# Patient Record
Sex: FEMALE | Race: WHITE | NOT HISPANIC OR LATINO | URBAN - METROPOLITAN AREA
[De-identification: names, ages, dates, MRNs, and addresses within clinical notes are randomized per-mention and may not be internally consistent; named-entity substitution may affect disease eponyms.]

---

## 2021-01-12 ENCOUNTER — OCCMED (OUTPATIENT)
Dept: URGENT CARE | Facility: CLINIC | Age: 31
End: 2021-01-12

## 2021-01-12 ENCOUNTER — APPOINTMENT (OUTPATIENT)
Dept: LAB | Facility: CLINIC | Age: 31
End: 2021-01-12

## 2021-01-12 DIAGNOSIS — Z02.1 PRE-EMPLOYMENT HEALTH SCREENING EXAMINATION: Primary | ICD-10-CM

## 2021-01-12 DIAGNOSIS — Z02.1 PRE-EMPLOYMENT HEALTH SCREENING EXAMINATION: ICD-10-CM

## 2021-01-12 PROCEDURE — 86787 VARICELLA-ZOSTER ANTIBODY: CPT

## 2021-01-12 PROCEDURE — 86762 RUBELLA ANTIBODY: CPT

## 2021-01-12 PROCEDURE — 86735 MUMPS ANTIBODY: CPT

## 2021-01-12 PROCEDURE — 86480 TB TEST CELL IMMUN MEASURE: CPT

## 2021-01-12 PROCEDURE — 86765 RUBEOLA ANTIBODY: CPT

## 2021-01-12 PROCEDURE — 36415 COLL VENOUS BLD VENIPUNCTURE: CPT

## 2021-01-13 LAB — RUBV IGG SERPL IA-ACNC: 22.2 IU/ML

## 2021-01-14 LAB
GAMMA INTERFERON BACKGROUND BLD IA-ACNC: 0.05 IU/ML
M TB IFN-G BLD-IMP: NEGATIVE
M TB IFN-G CD4+ BCKGRND COR BLD-ACNC: -0.01 IU/ML
M TB IFN-G CD4+ BCKGRND COR BLD-ACNC: -0.01 IU/ML
MEV IGG SER QL: ABNORMAL
MITOGEN IGNF BCKGRD COR BLD-ACNC: >10 IU/ML
MUV IGG SER QL: NORMAL
VZV IGG SER IA-ACNC: NORMAL

## 2021-03-25 ENCOUNTER — IMMUNIZATIONS (OUTPATIENT)
Dept: FAMILY MEDICINE CLINIC | Facility: HOSPITAL | Age: 31
End: 2021-03-25

## 2021-03-25 DIAGNOSIS — Z23 ENCOUNTER FOR IMMUNIZATION: Primary | ICD-10-CM

## 2021-03-25 PROCEDURE — 0001A SARS-COV-2 / COVID-19 MRNA VACCINE (PFIZER-BIONTECH) 30 MCG: CPT

## 2021-03-25 PROCEDURE — 91300 SARS-COV-2 / COVID-19 MRNA VACCINE (PFIZER-BIONTECH) 30 MCG: CPT

## 2021-04-16 ENCOUNTER — OFFICE VISIT (OUTPATIENT)
Dept: URGENT CARE | Facility: CLINIC | Age: 31
End: 2021-04-16
Payer: COMMERCIAL

## 2021-04-16 VITALS — HEART RATE: 60 BPM | RESPIRATION RATE: 16 BRPM | TEMPERATURE: 97.6 F | OXYGEN SATURATION: 98 %

## 2021-04-16 DIAGNOSIS — R39.15 URINARY URGENCY: Primary | ICD-10-CM

## 2021-04-16 LAB
SL AMB  POCT GLUCOSE, UA: NEGATIVE
SL AMB LEUKOCYTE ESTERASE,UA: ABNORMAL
SL AMB POCT BILIRUBIN,UA: NEGATIVE
SL AMB POCT BLOOD,UA: ABNORMAL
SL AMB POCT CLARITY,UA: ABNORMAL
SL AMB POCT COLOR,UA: YELLOW
SL AMB POCT KETONES,UA: NEGATIVE
SL AMB POCT NITRITE,UA: NEGATIVE
SL AMB POCT PH,UA: 6
SL AMB POCT SPECIFIC GRAVITY,UA: 1.01
SL AMB POCT URINE PROTEIN: NEGATIVE
SL AMB POCT UROBILINOGEN: 0.2

## 2021-04-16 PROCEDURE — G0382 LEV 3 HOSP TYPE B ED VISIT: HCPCS | Performed by: PHYSICIAN ASSISTANT

## 2021-04-16 PROCEDURE — 87086 URINE CULTURE/COLONY COUNT: CPT | Performed by: PHYSICIAN ASSISTANT

## 2021-04-16 RX ORDER — BUSPIRONE HYDROCHLORIDE 10 MG/1
10 TABLET ORAL 3 TIMES DAILY
COMMUNITY
Start: 2021-03-04

## 2021-04-16 RX ORDER — CIPROFLOXACIN 500 MG/1
500 TABLET, FILM COATED ORAL EVERY 12 HOURS SCHEDULED
Qty: 14 TABLET | Refills: 0 | Status: SHIPPED | OUTPATIENT
Start: 2021-04-16 | End: 2021-04-23

## 2021-04-16 RX ORDER — ESCITALOPRAM OXALATE 5 MG/1
5 TABLET ORAL DAILY
COMMUNITY
Start: 2021-03-04

## 2021-04-16 RX ORDER — TRAZODONE HYDROCHLORIDE 50 MG/1
50 TABLET ORAL DAILY
COMMUNITY
Start: 2021-03-04

## 2021-04-16 NOTE — PATIENT INSTRUCTIONS
Urinary Tract Infection in Women   WHAT YOU NEED TO KNOW:   A urinary tract infection (UTI) is caused by bacteria that get inside your urinary tract  Most bacteria that enter your urinary tract come out when you urinate  If the bacteria stay in your urinary tract, you may get an infection  Your urinary tract includes your kidneys, ureters, bladder, and urethra  Urine is made in your kidneys, and it flows from the ureters to the bladder  Urine leaves the bladder through the urethra  A UTI is more common in your lower urinary tract, which includes your bladder and urethra  DISCHARGE INSTRUCTIONS:   Return to the emergency department if:   · You are urinating very little or not at all  · You have a high fever with shaking chills  · You have side or back pain that gets worse  Call your doctor if:   · You have a fever  · You do not feel better after 2 days of taking antibiotics  · You are vomiting  · You have questions or concerns about your condition or care  Medicines:   · Antibiotics  help fight a bacterial infection  If you have UTIs often (called recurrent UTIs), you may be given antibiotics to take regularly  You will be given directions for when and how to use antibiotics  The goal is to prevent UTIs but not cause antibiotic resistance by using antibiotics too often  · Medicines  may be given to decrease pain and burning when you urinate  They will also help decrease the feeling that you need to urinate often  These medicines will make your urine orange or red  · Take your medicine as directed  Contact your healthcare provider if you think your medicine is not helping or if you have side effects  Tell him or her if you are allergic to any medicine  Keep a list of the medicines, vitamins, and herbs you take  Include the amounts, and when and why you take them  Bring the list or the pill bottles to follow-up visits   Carry your medicine list with you in case of an emergency  Follow up with your healthcare provider as directed:  Write down your questions so you remember to ask them during your visits  Prevent another UTI:   · Empty your bladder often  Urinate and empty your bladder as soon as you feel the need  Do not hold your urine for long periods of time  · Wipe from front to back after you urinate or have a bowel movement  This will help prevent germs from getting into your urinary tract through your urethra  · Drink liquids as directed  Ask how much liquid to drink each day and which liquids are best for you  You may need to drink more liquids than usual to help flush out the bacteria  Do not drink alcohol, caffeine, or citrus juices  These can irritate your bladder and increase your symptoms  Your healthcare provider may recommend cranberry juice to help prevent a UTI  · Urinate after you have sex  This can help flush out bacteria passed during sex  · Do not douche or use feminine deodorants  These can change the chemical balance in your vagina  · Change sanitary pads or tampons often  This will help prevent germs from getting into your urinary tract  · Talk to your healthcare provider about your birth control method  You may need to change your method if it is increasing your risk for UTIs  · Wear cotton underwear and clothes that are loose  Tight pants and nylon underwear can trap moisture and cause bacteria to grow  · Vaginal estrogen may be recommended  This medicine helps prevent UTIs in women who have gone through menopause or are in kendra-menopause  · Do pelvic muscle exercises often  Pelvic muscle exercises may help you start and stop urinating  Strong pelvic muscles may help you empty your bladder easier  Squeeze these muscles tightly for 5 seconds like you are trying to hold back urine  Then relax for 5 seconds  Gradually work up to squeezing for 10 seconds  Do 3 sets of 15 repetitions a day, or as directed      © Copyright [address] Information is for [employer] use only and may not be sold, redistributed or otherwise used for commercial purposes  All illustrations and images included in CareNotes® are the copyrighted property of [employer] D [employer] M [employer]  or Kee Hansen  The above information is an  only  It is not intended as medical advice for individual conditions or treatments  Talk to your doctor, nurse or pharmacist before following any medical regimen to see if it is safe and effective for you

## 2021-04-18 LAB — BACTERIA UR CULT: NORMAL

## 2021-04-19 ENCOUNTER — IMMUNIZATIONS (OUTPATIENT)
Dept: FAMILY MEDICINE CLINIC | Facility: HOSPITAL | Age: 31
End: 2021-04-19

## 2021-04-19 DIAGNOSIS — Z23 ENCOUNTER FOR IMMUNIZATION: Primary | ICD-10-CM

## 2021-04-19 PROCEDURE — 91300 SARS-COV-2 / COVID-19 MRNA VACCINE (PFIZER-BIONTECH) 30 MCG: CPT

## 2021-04-19 PROCEDURE — 0002A SARS-COV-2 / COVID-19 MRNA VACCINE (PFIZER-BIONTECH) 30 MCG: CPT

## 2021-04-19 NOTE — PROGRESS NOTES
[facility]        NAME: Hunter Smiley is a 31 y o  female  : 1990    MRN: [id number]  DATE: 2021  TIME: 8:33 AM    Assessment and Plan   Urinary urgency [R39 15]  1  Urinary urgency  POCT urine dip auto non-scope    Urine culture    ciprofloxacin (CIPRO) 500 mg tablet         Patient Instructions     Discussed symptoms and UA results with pt  I suspect an acute uncomplicated UTI  Will start pt on an oral abx and send out sample for culture to further evaluate  I rec increased hydration, rest, and observation  Should be reevaluated if condition persists/worsens  Follow up with PCP in 3-5 days  Proceed to  ER if symptoms worsen  Chief Complaint     Chief Complaint   Patient presents with    Possible UTI     began two days ago  pt reports urgency and a dull ache in her lower abdomen  denies fever/flank pain/dysuria         History of Present Illness       Pt presents with two day history of urinary frequency, urgency, lower abdominal discomfort  Denies dysuria, hematuria, flank pain, N/V, vaginal discharge, fever, chills  She has been hydrating  Denies hx of frequent/recurrent UTI  Review of Systems   Review of Systems   Constitutional: Negative  Respiratory: Negative  Cardiovascular: Negative  Gastrointestinal: Positive for abdominal pain (Lower)  Negative for nausea and vomiting  Genitourinary: Positive for frequency and urgency  Negative for dysuria, flank pain, hematuria and vaginal discharge           Current Medications       Current Outpatient Medications:     busPIRone (BUSPAR) 10 mg tablet, Take 10 mg by mouth 3 (three) times a day, Disp: , Rfl:     ciprofloxacin (CIPRO) 500 mg tablet, Take 1 tablet (500 mg total) by mouth every 12 (twelve) hours for 7 days, Disp: 14 tablet, Rfl: 0    escitalopram (LEXAPRO) 5 mg tablet, Take 5 mg by mouth daily, Disp: , Rfl:     traZODone (DESYREL) 50 mg tablet, Take 50 mg by mouth daily, Disp: , Rfl:     Current Allergies     Allergies as of 04/16/2021    (No Known Allergies)            The following portions of the patient's history were reviewed and updated as appropriate: allergies, current medications, past family history, past medical history, past social history, past surgical history and problem list      History reviewed  No pertinent past medical history  History reviewed  No pertinent surgical history  History reviewed  No pertinent family history  Medications have been verified  Objective   Pulse 60   Temp 97 6 °F (36 4 °C)   Resp 16   SpO2 98%   No LMP recorded  Physical Exam     Physical Exam  Vitals signs reviewed  Constitutional:       General: She is not in acute distress  Appearance: She is well-developed  HENT:      Mouth/Throat:      Mouth: Mucous membranes are moist       Pharynx: Oropharynx is clear  Cardiovascular:      Rate and Rhythm: Normal rate and regular rhythm  Heart sounds: Normal heart sounds  No murmur  Pulmonary:      Effort: Pulmonary effort is normal  No respiratory distress  Breath sounds: Normal breath sounds  Abdominal:      Tenderness: There is no right CVA tenderness or left CVA tenderness  Neurological:      Mental Status: She is alert and oriented to person, place, and time

## 2021-07-26 ENCOUNTER — HOSPITAL ENCOUNTER (EMERGENCY)
Facility: HOSPITAL | Age: 31
Discharge: HOME | End: 2021-07-26
Attending: EMERGENCY MEDICINE | Admitting: EMERGENCY MEDICINE
Payer: COMMERCIAL

## 2021-07-26 VITALS
RESPIRATION RATE: 18 BRPM | OXYGEN SATURATION: 98 % | HEART RATE: 69 BPM | SYSTOLIC BLOOD PRESSURE: 109 MMHG | TEMPERATURE: 97 F | DIASTOLIC BLOOD PRESSURE: 60 MMHG | WEIGHT: 125 LBS

## 2021-07-26 DIAGNOSIS — H53.412 VISUAL FIELD SCOTOMA OF LEFT EYE: Primary | ICD-10-CM

## 2021-07-26 PROCEDURE — 99283 EMERGENCY DEPT VISIT LOW MDM: CPT

## 2021-07-26 PROCEDURE — 99282 EMERGENCY DEPT VISIT SF MDM: CPT | Performed by: PHYSICIAN ASSISTANT

## 2021-07-26 NOTE — ED PROVIDER NOTES
History  Chief Complaint   Patient presents with    Blurred Vision     Patient states that about a month ago, she had blurry vision that lasted 15 minutes  The same thing happened today where she developed blurry vision while working and lasted about 15 minutes     32 yo female presents to the Emergency Department for evaluation of brief visual abnormality occurring today for 15 minutes  Describes a "wavy structure" in the L eye periphery  Had similar 1-2 months ago  No associated migraine headache  Currently asymptomatic  Does not use visual correction  Prior to Admission Medications   Prescriptions Last Dose Informant Patient Reported? Taking?   busPIRone (BUSPAR) 10 mg tablet   Yes No   Sig: Take 10 mg by mouth 3 (three) times a day   escitalopram (LEXAPRO) 5 mg tablet   Yes No   Sig: Take 5 mg by mouth daily   traZODone (DESYREL) 50 mg tablet   Yes No   Sig: Take 50 mg by mouth daily      Facility-Administered Medications: None       Past Medical History:   Diagnosis Date    Anxiety     Depression        History reviewed  No pertinent surgical history  History reviewed  No pertinent family history  I have reviewed and agree with the history as documented  E-Cigarette/Vaping     E-Cigarette/Vaping Substances     Social History     Tobacco Use    Smoking status: Former Smoker    Smokeless tobacco: Never Used   Substance Use Topics    Alcohol use: Not Currently    Drug use: Not Currently       Review of Systems   Constitutional: Negative for chills, diaphoresis and fever  Eyes: Positive for visual disturbance  Respiratory: Negative for cough and shortness of breath  Cardiovascular: Negative for chest pain and palpitations  Gastrointestinal: Negative for abdominal pain, diarrhea, nausea and vomiting  Genitourinary: Negative for dysuria, flank pain and frequency  Musculoskeletal: Negative for arthralgias and myalgias  Skin: Negative for color change, rash and wound  Allergic/Immunologic: Negative for immunocompromised state  Neurological: Negative for dizziness and light-headedness  Hematological: Does not bruise/bleed easily  Psychiatric/Behavioral: Negative for confusion  The patient is not nervous/anxious  Physical Exam  Physical Exam  Vitals reviewed  Constitutional:       General: She is not in acute distress  Appearance: She is well-developed  She is not diaphoretic  HENT:      Head: Normocephalic and atraumatic  Eyes:      General: No scleral icterus  Conjunctiva/sclera:      Right eye: Right conjunctiva is not injected  No chemosis  Left eye: Left conjunctiva is not injected  No chemosis  Pupils: Pupils are equal, round, and reactive to light  Funduscopic exam:     Right eye: No hemorrhage, exudate, AV nicking or papilledema  Red reflex present  Left eye: No hemorrhage, exudate, AV nicking or papilledema  Red reflex present  Visual Fields: Right eye visual fields normal and left eye visual fields normal    Cardiovascular:      Rate and Rhythm: Normal rate and regular rhythm  Heart sounds: No murmur heard  No friction rub  No gallop  Pulmonary:      Effort: No respiratory distress  Breath sounds: No wheezing or rales  Skin:     General: Skin is dry  Capillary Refill: Capillary refill takes less than 2 seconds     Psychiatric:         Behavior: Behavior normal          Vital Signs  ED Triage Vitals [07/26/21 1625]   Temperature Pulse Respirations Blood Pressure SpO2   (!) 97 °F (36 1 °C) 69 18 109/60 98 %      Temp Source Heart Rate Source Patient Position - Orthostatic VS BP Location FiO2 (%)   Tympanic -- Sitting Left arm --      Pain Score       --           Vitals:    07/26/21 1625   BP: 109/60   Pulse: 69   Patient Position - Orthostatic VS: Sitting         Visual Acuity      ED Medications  Medications - No data to display    Diagnostic Studies  Results Reviewed     None                 No orders to display              Procedures  Procedures         ED Course                             SBIRT 22yo+      Most Recent Value   SBIRT (24 yo +)   In order to provide better care to our patients, we are screening all of our patients for alcohol and drug use  Would it be okay to ask you these screening questions? No Filed at: 07/26/2021 1639                    [facility]  Number of Diagnoses or Management Options  Visual field scotoma of left eye: new and does not require workup  Diagnosis management comments: Likely ocular migraine  Recommend outpatient ophtho follow up       Amount and/or Complexity of Data Reviewed  Review and summarize past medical records: yes        Disposition  Final diagnoses:   Visual field scotoma of left eye     Time reflects when diagnosis was documented in both MDM as applicable and the Disposition within this note     Time User Action Codes Description Comment    7/26/2021  4:40 PM Marynadya Kimani Add [T92 207] Visual field scotoma of left eye       ED Disposition     ED Disposition Condition Date/Time Comment    Discharge Stable Mon Jul 26, 2021  4:40 PM Debbie Yeung discharge to home/self care  Follow-up Information     Follow up With Specialties Details Why Contact Info    Lucero Sims MD Ophthalmology   [address] [place], [place] [phone]            Discharge Medication List as of 7/26/2021  4:40 PM      CONTINUE these medications which have NOT CHANGED    Details   busPIRone (BUSPAR) 10 mg tablet Take 10 mg by mouth 3 (three) times a day, Starting Thu 3/4/2021, Historical Med      escitalopram (LEXAPRO) 5 mg tablet Take 5 mg by mouth daily, Starting Thu 3/4/2021, Historical Med      traZODone (DESYREL) 50 mg tablet Take 50 mg by mouth daily, Starting Thu 3/4/2021, Historical Med           No discharge procedures on file      PDMP Review     None          ED Provider  Electronically Signed by           Dago Silva PA-C  07/26/21 0896

## 2021-12-17 ENCOUNTER — APPOINTMENT (OUTPATIENT)
Dept: URGENT CARE | Facility: CLINIC | Age: 31
End: 2021-12-17

## 2021-12-17 DIAGNOSIS — Z02.1 PRE-EMPLOYMENT HEALTH SCREENING EXAMINATION: Primary | ICD-10-CM

## 2021-12-17 PROCEDURE — 86480 TB TEST CELL IMMUN MEASURE: CPT

## 2021-12-20 LAB
GAMMA INTERFERON BACKGROUND BLD IA-ACNC: 0.04 IU/ML
M TB IFN-G BLD-IMP: NEGATIVE
M TB IFN-G CD4+ BCKGRND COR BLD-ACNC: 0 IU/ML
M TB IFN-G CD4+ BCKGRND COR BLD-ACNC: 0.01 IU/ML
MITOGEN IGNF BCKGRD COR BLD-ACNC: >10 IU/ML

## 2022-04-26 ENCOUNTER — APPOINTMENT (OUTPATIENT)
Dept: LAB | Facility: CLINIC | Age: 32
End: 2022-04-26

## 2022-04-26 DIAGNOSIS — Z00.8 HEALTH EXAMINATION IN POPULATION SURVEYS: ICD-10-CM

## 2022-04-26 DIAGNOSIS — Z13.0 SCREENING FOR IRON DEFICIENCY ANEMIA: ICD-10-CM

## 2022-04-26 DIAGNOSIS — Z13.29 SCREENING FOR THYROID DISORDER: ICD-10-CM

## 2022-04-26 DIAGNOSIS — Z00.00 ANNUAL PHYSICAL EXAM: ICD-10-CM

## 2022-04-26 DIAGNOSIS — Z13.6 SCREENING FOR CARDIOVASCULAR CONDITION: ICD-10-CM

## 2022-04-26 LAB
CHOLEST SERPL-MCNC: 223 MG/DL
EST. AVERAGE GLUCOSE BLD GHB EST-MCNC: 103 MG/DL
HBA1C MFR BLD: 5.2 %
HDLC SERPL-MCNC: 59 MG/DL
LDLC SERPL CALC-MCNC: 148 MG/DL (ref 0–100)
NONHDLC SERPL-MCNC: 164 MG/DL
TRIGL SERPL-MCNC: 79 MG/DL

## 2022-04-26 PROCEDURE — 83550 IRON BINDING TEST: CPT

## 2022-04-26 PROCEDURE — 80061 LIPID PANEL: CPT

## 2022-04-26 PROCEDURE — 84443 ASSAY THYROID STIM HORMONE: CPT

## 2022-04-26 PROCEDURE — 80053 COMPREHEN METABOLIC PANEL: CPT

## 2022-04-26 PROCEDURE — 83036 HEMOGLOBIN GLYCOSYLATED A1C: CPT

## 2022-04-26 PROCEDURE — 83540 ASSAY OF IRON: CPT

## 2022-04-26 PROCEDURE — 36415 COLL VENOUS BLD VENIPUNCTURE: CPT

## 2022-04-26 PROCEDURE — 82728 ASSAY OF FERRITIN: CPT

## 2022-04-27 ENCOUNTER — OFFICE VISIT (OUTPATIENT)
Dept: FAMILY MEDICINE CLINIC | Facility: CLINIC | Age: 32
End: 2022-04-27
Payer: COMMERCIAL

## 2022-04-27 VITALS
RESPIRATION RATE: 16 BRPM | HEIGHT: 63 IN | OXYGEN SATURATION: 100 % | SYSTOLIC BLOOD PRESSURE: 116 MMHG | WEIGHT: 125.8 LBS | TEMPERATURE: 98 F | HEART RATE: 64 BPM | DIASTOLIC BLOOD PRESSURE: 72 MMHG | BODY MASS INDEX: 22.29 KG/M2

## 2022-04-27 DIAGNOSIS — Z13.29 SCREENING FOR THYROID DISORDER: ICD-10-CM

## 2022-04-27 DIAGNOSIS — Z11.59 ENCOUNTER FOR HEPATITIS C SCREENING TEST FOR LOW RISK PATIENT: ICD-10-CM

## 2022-04-27 DIAGNOSIS — Z13.6 SCREENING FOR CARDIOVASCULAR CONDITION: ICD-10-CM

## 2022-04-27 DIAGNOSIS — Z11.4 SCREENING FOR HIV WITHOUT PRESENCE OF RISK FACTORS: ICD-10-CM

## 2022-04-27 DIAGNOSIS — Z00.00 ANNUAL PHYSICAL EXAM: Primary | ICD-10-CM

## 2022-04-27 DIAGNOSIS — Z13.0 SCREENING FOR IRON DEFICIENCY ANEMIA: ICD-10-CM

## 2022-04-27 DIAGNOSIS — Z13.1 SCREENING FOR DIABETES MELLITUS: ICD-10-CM

## 2022-04-27 PROBLEM — F33.41 RECURRENT MAJOR DEPRESSIVE DISORDER, IN PARTIAL REMISSION (HCC): Status: ACTIVE | Noted: 2022-04-27

## 2022-04-27 PROBLEM — D50.8 IRON DEFICIENCY ANEMIA SECONDARY TO INADEQUATE DIETARY IRON INTAKE: Status: ACTIVE | Noted: 2022-04-27

## 2022-04-27 PROBLEM — G47.09 OTHER INSOMNIA: Status: ACTIVE | Noted: 2022-04-27

## 2022-04-27 PROBLEM — F41.9 ANXIETY: Status: ACTIVE | Noted: 2022-04-27

## 2022-04-27 LAB
ALBUMIN SERPL BCP-MCNC: 3.8 G/DL (ref 3.5–5)
ALP SERPL-CCNC: 48 U/L (ref 46–116)
ALT SERPL W P-5'-P-CCNC: 26 U/L (ref 12–78)
ANION GAP SERPL CALCULATED.3IONS-SCNC: 4 MMOL/L (ref 4–13)
AST SERPL W P-5'-P-CCNC: 22 U/L (ref 5–45)
BILIRUB SERPL-MCNC: 0.48 MG/DL (ref 0.2–1)
BUN SERPL-MCNC: 12 MG/DL (ref 5–25)
CALCIUM SERPL-MCNC: 9 MG/DL (ref 8.3–10.1)
CHLORIDE SERPL-SCNC: 105 MMOL/L (ref 100–108)
CO2 SERPL-SCNC: 27 MMOL/L (ref 21–32)
CREAT SERPL-MCNC: 0.68 MG/DL (ref 0.6–1.3)
FERRITIN SERPL-MCNC: 74 NG/ML (ref 8–388)
GFR SERPL CREATININE-BSD FRML MDRD: 116 ML/MIN/1.73SQ M
GLUCOSE SERPL-MCNC: 93 MG/DL (ref 65–140)
IRON SERPL-MCNC: 115 UG/DL (ref 50–170)
POTASSIUM SERPL-SCNC: 4.3 MMOL/L (ref 3.5–5.3)
PROT SERPL-MCNC: 6.9 G/DL (ref 6.4–8.2)
SODIUM SERPL-SCNC: 136 MMOL/L (ref 136–145)
TIBC SERPL-MCNC: 328 UG/DL (ref 250–450)
TSH SERPL DL<=0.05 MIU/L-ACNC: 1.46 UIU/ML (ref 0.45–4.5)

## 2022-04-27 PROCEDURE — 99385 PREV VISIT NEW AGE 18-39: CPT | Performed by: NURSE PRACTITIONER

## 2022-04-27 RX ORDER — CLINDAMYCIN PHOSPHATE 10 MG/ML
SOLUTION TOPICAL
COMMUNITY
Start: 2022-04-25

## 2022-04-27 NOTE — PROGRESS NOTES
ADULT ANNUAL PHYSICAL  [address]    NAME: Debbie Yeung  AGE: 27 y o  SEX: female  : 1990     DATE: 2022     Assessment and Plan:     Problem List Items Addressed This Visit     None      Visit Diagnoses     Annual physical exam    -  Primary    Relevant Orders    CBC and differential    TSH, 3rd generation with Free T4 reflex    Comprehensive metabolic panel    Ambulatory Referral to Obstetrics / Gynecology    Screening for cardiovascular condition        Relevant Orders    CBC and differential    Comprehensive metabolic panel    Screening for iron deficiency anemia        Relevant Orders    Iron    Ferritin    TIBC    Screening for thyroid disorder        Relevant Orders    TSH, 3rd generation with Free T4 reflex    Screening for diabetes mellitus        Relevant Orders    UA (URINE) with reflex to Scope    Encounter for hepatitis C screening test for low risk patient        Relevant Orders    Hepatitis C antibody    Screening for HIV without presence of risk factors        Relevant Orders    HIV 1/2 Antigen/Antibody (4th Generation) w Reflex [facility]          Immunizations and preventive care screenings were discussed with patient today  Appropriate education was printed on patient's after visit summary  Counseling:  Alcohol/drug use: discussed moderation in alcohol intake, the recommendations for healthy alcohol use, and avoidance of illicit drug use  Dental Health: discussed importance of regular tooth brushing, flossing, and dental visits  Injury prevention: discussed safety/seat belts, safety helmets, smoke detectors, carbon dioxide detectors, and smoking near bedding or upholstery  Sexual health: discussed sexually transmitted diseases, partner selection, use of condoms, avoidance of unintended pregnancy, and contraceptive alternatives  · Exercise: the importance of regular exercise/physical activity was discussed   Recommend exercise 3-5 times per week for at least 30 minutes  Depression Screening and Follow-up Plan: Patient was screened for depression during today's encounter  They screened negative with a PHQ-2 score of 0  Return in 1 year (on 4/27/2023) for Annual physical      Chief Complaint:     Chief Complaint   Patient presents with    Establish care     needs wellness done for caring starts with you      History of Present Illness:     Adult Annual Physical   Patient here for a comprehensive physical exam  The patient reports no problems  Diet and Physical Activity  · Diet/Nutrition: well balanced diet, frequent junk food and consuming 3-5 servings of fruits/vegetables daily  · Exercise: 1-2 times a week on average and 30-60 minutes on average  Depression Screening  PHQ-2/9 Depression Screening    Little interest or pleasure in doing things: 0 - not at all  Feeling down, depressed, or hopeless: 0 - not at all  PHQ-2 Score: 0  PHQ-2 Interpretation: Negative depression screen       General Health  · Sleep: sleeps well and gets 7-8 hours of sleep on average  · Hearing: normal - bilateral   · Vision: vision problems: wears glasses at night with driving, most recent eye exam <1 year ago and wears glasses  · Dental: regular dental visits and brushes teeth twice daily  Flosses daily  /GYN Health  · Last menstrual period: 4/10/22  · Contraceptive method: IUD placement  · History of STDs?: no      Review of Systems:     Review of Systems   Constitutional: Negative  Negative for chills, fatigue and fever  HENT: Negative  Negative for congestion, ear discharge, ear pain, rhinorrhea, sinus pressure, sinus pain and sore throat  Eyes: Negative  Negative for photophobia, pain and discharge  Respiratory: Negative  Negative for cough, chest tightness, shortness of breath and wheezing  Cardiovascular: Negative  Negative for chest pain, palpitations and leg swelling     Gastrointestinal: Negative  Negative for abdominal pain, constipation, diarrhea, nausea and vomiting  Endocrine: Negative  Negative for polydipsia and polyuria  Genitourinary: Negative  Negative for difficulty urinating, dysuria and hematuria  Musculoskeletal: Negative  Negative for arthralgias, back pain, myalgias, neck pain and neck stiffness  Skin: Negative  Negative for rash  Allergic/Immunologic: Positive for environmental allergies  Neurological: Negative  Negative for dizziness, seizures, syncope, light-headedness, numbness and headaches  Hematological: Negative  Does not bruise/bleed easily  Psychiatric/Behavioral: Negative  Negative for agitation  The patient is not nervous/anxious  Past Medical History:     Past Medical History:   Diagnosis Date    Anxiety     Depression       Past Surgical History:     History reviewed  No pertinent surgical history  Social History:     Social History     Socioeconomic History    Marital status: Single     Spouse name: None    Number of children: None    Years of education: None    Highest education level: None   Occupational History    None   Tobacco Use    Smoking status: Former Smoker     Types: Cigarettes     Quit date: 4/27/2019     Years since quitting: 3 0    Smokeless tobacco: Never Used   Vaping Use    Vaping Use: Never used   Substance and Sexual Activity    Alcohol use: Not Currently    Drug use: Not Currently    Sexual activity: None   Other Topics Concern    None   Social History Narrative    None     Social Determinants of Health     Financial Resource Strain: Not on file   Food Insecurity: Not on file   Transportation Needs: Not on file   Physical Activity: Not on file   Stress: Not on file   Social Connections: Not on file   Intimate Partner Violence: Not on file   Housing Stability: Not on file      Family History:     History reviewed  No pertinent family history     Current Medications:     Current Outpatient Medications Medication Sig Dispense Refill    busPIRone (BUSPAR) 10 mg tablet Take 10 mg by mouth 3 (three) times a day      escitalopram (LEXAPRO) 5 mg tablet Take 5 mg by mouth daily      traZODone (DESYREL) 50 mg tablet Take 50 mg by mouth daily      clindamycin (CLEOCIN T) 1 %  (Patient not taking: Reported on 4/27/2022 )       No current facility-administered medications for this visit  Allergies:     No Known Allergies   Physical Exam:     /72 (BP Location: Left arm, Patient Position: Sitting, Cuff Size: Standard)   Pulse 64   Temp 98 °F (36 7 °C) (Tympanic)   Resp 16   Ht 5' 3" (1 6 m)   Wt 57 1 kg (125 lb 12 8 oz)   LMP 04/10/2022 (Exact Date)   SpO2 100%   Breastfeeding No   BMI 22 28 kg/m²     Physical Exam  Vitals and nursing note reviewed  Constitutional:       General: She is not in acute distress  Appearance: Normal appearance  She is well-developed and normal weight  She is not ill-appearing  HENT:      Head: Normocephalic and atraumatic  Right Ear: Tympanic membrane, ear canal and external ear normal  There is no impacted cerumen  Left Ear: Tympanic membrane, ear canal and external ear normal  There is no impacted cerumen  Nose: Nose normal  No congestion or rhinorrhea  Mouth/Throat:      Mouth: Mucous membranes are moist       Pharynx: Oropharynx is clear  No oropharyngeal exudate  Eyes:      Extraocular Movements: Extraocular movements intact  Conjunctiva/sclera: Conjunctivae normal    Cardiovascular:      Rate and Rhythm: Normal rate and regular rhythm  Pulses: Normal pulses  Heart sounds: Normal heart sounds  No murmur heard  Pulmonary:      Effort: Pulmonary effort is normal  No respiratory distress  Breath sounds: Normal breath sounds  Abdominal:      General: Bowel sounds are normal       Palpations: Abdomen is soft  Tenderness: There is no abdominal tenderness     Musculoskeletal:         General: Normal range of motion  Cervical back: Normal range of motion and neck supple  Right lower leg: No edema  Left lower leg: No edema  Lymphadenopathy:      Cervical: No cervical adenopathy  Skin:     General: Skin is warm and dry  Capillary Refill: Capillary refill takes less than 2 seconds  Neurological:      General: No focal deficit present  Mental Status: She is alert and oriented to person, place, and time  Psychiatric:         Mood and Affect: Mood normal          Behavior: Behavior normal          Thought Content:  Thought content normal          Judgment: Judgment normal           CINDI Hill   [address] [facility]

## 2022-04-27 NOTE — PATIENT INSTRUCTIONS
Have labs drawn- will call patient with results  Follow up in 1 year for annual physical      Wellness Visit for Adults   AMBULATORY CARE:   A wellness visit  is when you see your healthcare provider to get screened for health problems  Your healthcare provider will also give you advice on how to stay healthy  Write down your questions so you remember to ask them  Ask your healthcare provider how often you should have a wellness visit  What happens at a wellness visit:  Your healthcare provider will ask about your health, and your family history of health problems  This includes high blood pressure, heart disease, and cancer  He or she will ask if you have symptoms that concern you, if you smoke, and about your mood  You may also be asked about your intake of medicines, supplements, food, and alcohol  Any of the following may be done:  · Your weight  will be checked  Your height may also be checked so your body mass index (BMI) can be calculated  Your BMI shows if you are at a healthy weight  · Your blood pressure  and heart rate will be checked  Your temperature may also be checked  · Blood and urine tests  may be done  Blood tests may be done to check your cholesterol levels  Abnormal cholesterol levels increase your risk for heart disease and stroke  You may also need a blood or urine test to check for diabetes if you are at increased risk  Urine tests may be done to look for signs of an infection or kidney disease  · A physical exam  includes checking your heartbeat and lungs with a stethoscope  Your healthcare provider may also check your skin to look for sun damage  · Screening tests  may be recommended  A screening test is done to check for diseases that may not cause symptoms  The screening tests you may need depend on your age, gender, family history, and lifestyle habits  For example, colorectal screening may be recommended if you are 48years old or older      Screening tests you need if you are a woman:   · A Pap smear  is used to screen for cervical cancer  Pap smears are usually done every 3 to 5 years depending on your age  You may need them more often if you have had abnormal Pap smear test results in the past  Ask your healthcare provider how often you should have a Pap smear  · A mammogram  is an x-ray of your breasts to screen for breast cancer  Experts recommend mammograms every 2 years starting at age 48 years  You may need a mammogram at age 52 years or younger if you have an increased risk for breast cancer  Talk to your healthcare provider about when you should start having mammograms and how often you need them  Vaccines you may need:   · Get an influenza vaccine  every year  The influenza vaccine protects you from the flu  Several types of viruses cause the flu  The viruses change over time, so new vaccines are made each year  · Get a tetanus-diphtheria (Td) booster vaccine  every 10 years  This vaccine protects you against tetanus and diphtheria  Tetanus is a severe infection that may cause painful muscle spasms and lockjaw  Diphtheria is a severe bacterial infection that causes a thick covering in the back of your mouth and throat  · Get a human papillomavirus (HPV) vaccine  if you are female and aged 23 to 32 or male 23 to 24 and never received it  This vaccine protects you from HPV infection  HPV is the most common infection spread by sexual contact  HPV may also cause vaginal, penile, and anal cancers  · Get a pneumococcal vaccine  if you are aged 72 years or older  The pneumococcal vaccine is an injection given to protect you from pneumococcal disease  Pneumococcal disease is an infection caused by pneumococcal bacteria  The infection may cause pneumonia, meningitis, or an ear infection  · Get a shingles vaccine  if you are 60 or older, even if you have had shingles before  The shingles vaccine is an injection to protect you from the varicella-zoster virus   This is the same virus that causes chickenpox  Shingles is a painful rash that develops in people who had chickenpox or have been exposed to the virus  How to eat healthy:  My Plate is a model for planning healthy meals  It shows the types and amounts of foods that should go on your plate  Fruits and vegetables make up about half of your plate, and grains and protein make up the other half  A serving of dairy is included on the side of your plate  The amount of calories and serving sizes you need depends on your age, gender, weight, and height  Examples of healthy foods are listed below:  · Eat a variety of vegetables  such as dark green, red, and orange vegetables  You can also include canned vegetables low in sodium (salt) and frozen vegetables without added butter or sauces  · Eat a variety of fresh fruits , canned fruit in 100% juice, frozen fruit, and dried fruit  · Include whole grains  At least half of the grains you eat should be whole grains  Examples include whole-wheat bread, wheat pasta, brown rice, and whole-grain cereals such as oatmeal     · Eat a variety of protein foods such as seafood (fish and shellfish), lean meat, and poultry without skin (turkey and chicken)  Examples of lean meats include pork leg, shoulder, or tenderloin, and beef round, sirloin, tenderloin, and extra lean ground beef  Other protein foods include eggs and egg substitutes, beans, peas, soy products, nuts, and seeds  · Choose low-fat dairy products such as skim or 1% milk or low-fat yogurt, cheese, and cottage cheese  · Limit unhealthy fats  such as butter, hard margarine, and shortening  Exercise:  Exercise at least 30 minutes per day on most days of the week  Some examples of exercise include walking, biking, dancing, and swimming  You can also fit in more physical activity by taking the stairs instead of the elevator or parking farther away from stores  Include muscle strengthening activities 2 days each week  Regular exercise provides many health benefits  It helps you manage your weight, and decreases your risk for type 2 diabetes, heart disease, stroke, and high blood pressure  Exercise can also help improve your mood  Ask your healthcare provider about the best exercise plan for you  General health and safety guidelines:   · Do not smoke  Nicotine and other chemicals in cigarettes and cigars can cause lung damage  Ask your healthcare provider for information if you currently smoke and need help to quit  E-cigarettes or smokeless tobacco still contain nicotine  Talk to your healthcare provider before you use these products  · Limit alcohol  A drink of alcohol is 12 ounces of beer, 5 ounces of wine, or 1½ ounces of liquor  · Lose weight, if needed  Being overweight increases your risk of certain health conditions  These include heart disease, high blood pressure, type 2 diabetes, and certain types of cancer  · Protect your skin  Do not sunbathe or use tanning beds  Use sunscreen with a SPF 15 or higher  Apply sunscreen at least 15 minutes before you go outside  Reapply sunscreen every 2 hours  Wear protective clothing, hats, and sunglasses when you are outside  · Drive safely  Always wear your seatbelt  Make sure everyone in your car wears a seatbelt  A seatbelt can save your life if you are in an accident  Do not use your cell phone when you are driving  This could distract you and cause an accident  Pull over if you need to make a call or send a text message  · Practice safe sex  Use latex condoms if are sexually active and have more than one partner  Your healthcare provider may recommend screening tests for sexually transmitted infections (STIs)  · Wear helmets, lifejackets, and protective gear  Always wear a helmet when you ride a bike or motorcycle, go skiing, or play sports that could cause a head injury  Wear protective equipment when you play sports   Wear a lifejacket when you are on a boat or doing water sports  © Copyright [employer] 2022 Information is for End User's use only and may not be sold, redistributed or otherwise used for commercial purposes  All illustrations and images included in CareNotes® are the copyrighted property of A D A M , Inc  or Kee Hansen  The above information is an  only  It is not intended as medical advice for individual conditions or treatments  Talk to your doctor, nurse or pharmacist before following any medical regimen to see if it is safe and effective for you

## 2022-04-28 ENCOUNTER — APPOINTMENT (OUTPATIENT)
Dept: LAB | Facility: HOSPITAL | Age: 32
End: 2022-04-28
Payer: COMMERCIAL

## 2022-04-28 DIAGNOSIS — Z11.4 SCREENING FOR HIV WITHOUT PRESENCE OF RISK FACTORS: ICD-10-CM

## 2022-04-28 DIAGNOSIS — Z11.59 ENCOUNTER FOR HEPATITIS C SCREENING TEST FOR LOW RISK PATIENT: ICD-10-CM

## 2022-04-28 LAB
BACTERIA UR QL AUTO: NORMAL /HPF
BILIRUB UR QL STRIP: NEGATIVE
CLARITY UR: CLEAR
COLOR UR: YELLOW
GLUCOSE UR STRIP-MCNC: NEGATIVE MG/DL
HCV AB SER QL: NORMAL
HGB UR QL STRIP.AUTO: ABNORMAL
KETONES UR STRIP-MCNC: NEGATIVE MG/DL
LEUKOCYTE ESTERASE UR QL STRIP: NEGATIVE
NITRITE UR QL STRIP: NEGATIVE
NON-SQ EPI CELLS URNS QL MICRO: NORMAL /HPF
PH UR STRIP.AUTO: 6 [PH]
PROT UR STRIP-MCNC: NEGATIVE MG/DL
RBC #/AREA URNS AUTO: NORMAL /HPF
SP GR UR STRIP.AUTO: 1.01 (ref 1–1.03)
UROBILINOGEN UR QL STRIP.AUTO: 0.2 E.U./DL
WBC #/AREA URNS AUTO: NORMAL /HPF

## 2022-04-28 PROCEDURE — 87389 HIV-1 AG W/HIV-1&-2 AB AG IA: CPT

## 2022-04-28 PROCEDURE — 81001 URINALYSIS AUTO W/SCOPE: CPT | Performed by: NURSE PRACTITIONER

## 2022-04-28 PROCEDURE — 36415 COLL VENOUS BLD VENIPUNCTURE: CPT

## 2022-04-28 PROCEDURE — 86803 HEPATITIS C AB TEST: CPT

## 2022-04-29 LAB — HIV 1+2 AB+HIV1 P24 AG SERPL QL IA: NORMAL

## 2022-08-11 ENCOUNTER — TELEPHONE (OUTPATIENT)
Dept: DERMATOLOGY | Facility: CLINIC | Age: 32
End: 2022-08-11

## 2022-10-04 ENCOUNTER — TELEMEDICINE (OUTPATIENT)
Dept: BEHAVIORAL/MENTAL HEALTH CLINIC | Facility: CLINIC | Age: 32
End: 2022-10-04

## 2022-10-04 DIAGNOSIS — F41.1 GENERALIZED ANXIETY DISORDER: Primary | ICD-10-CM

## 2022-10-04 NOTE — BH TREATMENT PLAN
Debbie Yeung  1990       Date of Initial Treatment Plan: 10/4/2022  Date of Current Treatment Plan: 11/02/22    Treatment Plan Number 1    Strengths/Personal Resources for Self Care:  Wants to start working out    Diagnosis:   1  Generalized anxiety disorder         Area of Needs: Co dependency and anxiety       Long Term Goal 1: Decrease relationship anxiety  Target Date: 4/4/2023  Completion Date: N/A         Short Term Objectives for Goal 1: Learn how to not be so preoccupied with relationship  Long Term Goal 2: Increase coping skills for anxiety  Target Date: 4/4/2023  Completion Date: N/A    Short Term Objectives for Goal 2: Coping mechanisms for relational anxiety and work, home, school life balance  Long Term Goal # 3: N/A     Target Date: N/A  Completion Date: N/A    Short Term Objectives for Goal 3: N/A    GOAL 1: Modality: Individual 2-4xx per month   Completion Date 6 months from date    GOAL 2: Modality: Individual 2x per month   Completion Date NA     GOAL 3: Modality: Individual NAx per month   Completion Date NA      Behavioral Health Treatment Plan [facility]: Diagnosis and Treatment Plan explained to Isael Murdock relates understanding diagnosis and is agreeable to Treatment Plan  Client Comments : Please share your thoughts, feelings, need and/or experiences regarding your treatment plan: "I feel that a lot of my anxiety comes from my thoughts " Finding a way to redirect my thoughts would be helpful  Currently tries coping statements and gives ultimatums with boyfriend and have not found them effective  Seeking trauma informed counseling  Would also like to work on people pleasing and self-doubt
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
## 2022-10-11 ENCOUNTER — TELEPHONE (OUTPATIENT)
Dept: BEHAVIORAL/MENTAL HEALTH CLINIC | Facility: CLINIC | Age: 32
End: 2022-10-11

## 2022-10-11 NOTE — TELEPHONE ENCOUNTER
PAULO contacted patient to verify if she needed assistance logging into the session or if she needed to reschedule her appointment  Patient discussed that she was having trouble accessing the link  Therapist in agreement to resend the link so she can join  Patient logged into the session and was at a park and was struggling to find Internet service  Therapist expressed patient needed to be somewhere alone to ensure confidentiality and offered to rescheduled her appointment   Patient in agreement and was rescheduled for 10/18 at 3:00pm

## 2022-10-18 ENCOUNTER — TELEPHONE (OUTPATIENT)
Dept: BEHAVIORAL/MENTAL HEALTH CLINIC | Facility: CLINIC | Age: 32
End: 2022-10-18

## 2022-10-18 ENCOUNTER — TELEMEDICINE (OUTPATIENT)
Dept: BEHAVIORAL/MENTAL HEALTH CLINIC | Facility: CLINIC | Age: 32
End: 2022-10-18

## 2022-10-18 DIAGNOSIS — F41.1 GENERALIZED ANXIETY DISORDER: Primary | ICD-10-CM

## 2022-10-18 NOTE — PSYCH
Start Time: 3:09 PM  End Time: 5:58 PM    Virtual Regular Visit    Verification of patient location:    Patient is located in the following state in which I hold an active license [place]      Assessment/Plan:    Problem List Items Addressed This Visit        Other    Generalized anxiety disorder - Primary          Goals addressed in session: Therapist worked on building therapeutic rapport and exploring treatment goals  Reason for visit is No chief complaint on file  Encounter provider BRANDYN Shannon    Provider located at [address] [place] [address]  [phone]      Recent Visits  Date Type Provider Dept   10/18/22 Telephone Lashay Lipscomb [address] [employer] [employer] [facility]   10/18/22 Telemedicine Lashay Lipscomb [address] Therapist [facility]   Showing recent visits within past 7 days and meeting all other requirements  Future Appointments  No visits were found meeting these conditions  Showing future appointments within next 150 days and meeting all other requirements       The patient was identified by name and date of birth  Farhat Patterson was informed that this is a telemedicine visit and that the visit is being conducted through[address]  She agrees to proceed     My office door was closed  No one else was in the room  She acknowledged consent and understanding of privacy and security of the video platform  The patient has agreed to participate and understands they can discontinue the visit at any time  Patient is aware this is a billable service  Subjective  Farhat Patterson is a 27 y o  female  DATA: Met with Debbie for scheduled individual session  Topics of discussion included relationship with significant other, family stressors, relationships with family, trauma history, parenting concerns, financial issues and substance use recovery   Client joined the session from her car and appeared to be driving  Therapist expressed concern regarding safety and requested that she hang up and sign back in when she arrived home safely; Client in agreement  Client reported wanting to engage in therapy to lean how to eliminate her co-dependency in her relationship with her partner  Client reported that she her partner was away for the past 4 1/2 years in residential and prior to that he was addicted to drug and they have a challenging relationship  She discussed that he returned home in April and acknowledged that he has had a hard time transitioning  She dicussed that he was just prescribed Suboxone and she is unhappy about it as he was sober in residential  Client reported that she was previously prescribed Suboxone for 10 years and identified that his use makes her feel frustrated  She discussed that although her partner is sober, he is still living his past lifestyle staying up all night which has created arguments between them  Client discussed that they share a 15 year old son and they have been together for the past 14 years  Client is currently working part time and 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  Client shows evidence of utilizing emotion regulation skills and effective communication skills skills to manage mental health symptoms  During this session, this clinician used the following therapeutic modalities: engagement strategies, supportive psychotherapy and client-centered therapy  ASSESSMENT: Wang Ortez presents with a depressed mood  Her affect is normal range and intensity, appropriate  Debbie exhibits early engagement with this clinician  Wang Ortez continues to exhibit willingness to work on treatment goals and objectives  Wang Ortez presents with a minimal risk of suicide, minimal risk of self-harm, and minimal risk of harm to others  PLAN: Wang Ortez will return in 1 week for the next scheduled session   Between sessionsDebbie will consider possible treatment goals that she would like to work on in sessions and will report back during the next session re: successes and barriers  At the next session, this clinician will use engagement strategies, supportive psychotherapy and client-centered therapy to address anxiety symptoms, in an effort to assist Debbie with meeting treatment goals  HPI     Past Medical History:   Diagnosis Date   • Anxiety    • Depression        No past surgical history on file  Current Outpatient Medications   Medication Sig Dispense Refill   • busPIRone (BUSPAR) 10 mg tablet Take 10 mg by mouth 3 (three) times a day     • clindamycin (CLEOCIN T) 1 %  (Patient not taking: Reported on 4/27/2022 )     • escitalopram (LEXAPRO) 5 mg tablet Take 5 mg by mouth daily     • traZODone (DESYREL) 50 mg tablet Take 50 mg by mouth daily       No current facility-administered medications for this visit  No Known Allergies    Review of Systems    Video Exam    There were no vitals filed for this visit      Physical Exam     I spent 49 minutes directly with the patient during this visit    This note was not shared with the patient due to this is a psychotherapy note

## 2022-10-20 ENCOUNTER — OFFICE VISIT (OUTPATIENT)
Dept: GYNECOLOGY | Facility: CLINIC | Age: 32
End: 2022-10-20
Payer: COMMERCIAL

## 2022-10-20 VITALS
BODY MASS INDEX: 21.58 KG/M2 | WEIGHT: 121.8 LBS | HEIGHT: 63 IN | DIASTOLIC BLOOD PRESSURE: 72 MMHG | SYSTOLIC BLOOD PRESSURE: 116 MMHG

## 2022-10-20 DIAGNOSIS — F41.1 GENERALIZED ANXIETY DISORDER: ICD-10-CM

## 2022-10-20 DIAGNOSIS — L70.9 ADULT ACNE: ICD-10-CM

## 2022-10-20 DIAGNOSIS — F33.41 RECURRENT MAJOR DEPRESSIVE DISORDER, IN PARTIAL REMISSION (HCC): ICD-10-CM

## 2022-10-20 DIAGNOSIS — Z01.419 WOMEN'S ANNUAL ROUTINE GYNECOLOGICAL EXAMINATION: ICD-10-CM

## 2022-10-20 DIAGNOSIS — N92.6 IRREGULAR MENSTRUATION: ICD-10-CM

## 2022-10-20 DIAGNOSIS — L68.0 IDIOPATHIC HIRSUTISM: Primary | ICD-10-CM

## 2022-10-20 DIAGNOSIS — F41.9 ANXIETY: ICD-10-CM

## 2022-10-20 DIAGNOSIS — N94.6 DYSMENORRHEA: ICD-10-CM

## 2022-10-20 DIAGNOSIS — Z12.4 SCREENING FOR CERVICAL CANCER: ICD-10-CM

## 2022-10-20 DIAGNOSIS — E28.2 PCOS (POLYCYSTIC OVARIAN SYNDROME): ICD-10-CM

## 2022-10-20 DIAGNOSIS — Z97.5 IUD (INTRAUTERINE DEVICE) IN PLACE: ICD-10-CM

## 2022-10-20 PROCEDURE — 99395 PREV VISIT EST AGE 18-39: CPT | Performed by: OBSTETRICS & GYNECOLOGY
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
## 2022-10-23 LAB
DHEA-S SERPL-MCNC: 132 UG/DL (ref 84.8–378)
ERYTHROCYTE [DISTWIDTH] IN BLOOD BY AUTOMATED COUNT: 12.4 % (ref 11.7–15.4)
HCG INTACT+B SERPL-ACNC: <1 MIU/ML
HCT VFR BLD AUTO: 39.7 % (ref 34–46.6)
HGB BLD-MCNC: 13.2 G/DL (ref 11.1–15.9)
MCH RBC QN AUTO: 29.8 PG (ref 26.6–33)
MCHC RBC AUTO-ENTMCNC: 33.2 G/DL (ref 31.5–35.7)
MCV RBC AUTO: 90 FL (ref 79–97)
PLATELET # BLD AUTO: 254 X10E3/UL (ref 150–450)
RBC # BLD AUTO: 4.43 X10E6/UL (ref 3.77–5.28)
TESTOST FREE SERPL-MCNC: 2.5 PG/ML (ref 0–4.2)
TESTOST SERPL-MCNC: 21 NG/DL (ref 8–60)
TSH SERPL DL<=0.005 MIU/L-ACNC: 2.13 UIU/ML (ref 0.45–4.5)
WBC # BLD AUTO: 6.8 X10E3/UL (ref 3.4–10.8)

## 2022-10-25 LAB
CYTOLOGIST CVX/VAG CYTO: NORMAL
DX ICD CODE: NORMAL
HPV I/H RISK 4 DNA CVX QL PROBE+SIG AMP: NEGATIVE
OTHER STN SPEC: NORMAL
PATH REPORT.FINAL DX SPEC: NORMAL
SL AMB NOTE:: NORMAL
SL AMB SPECIMEN ADEQUACY: NORMAL
SL AMB TEST METHODOLOGY: NORMAL

## 2022-10-28 ENCOUNTER — TELEMEDICINE (OUTPATIENT)
Dept: BEHAVIORAL/MENTAL HEALTH CLINIC | Facility: CLINIC | Age: 32
End: 2022-10-28

## 2022-10-28 DIAGNOSIS — F41.1 GENERALIZED ANXIETY DISORDER: Primary | ICD-10-CM

## 2022-10-28 DIAGNOSIS — F33.41 RECURRENT MAJOR DEPRESSIVE DISORDER, IN PARTIAL REMISSION (HCC): ICD-10-CM

## 2022-10-28 NOTE — PSYCH
Virtual Regular Visit    Verification of patient location:    Patient is located in the following state in which I hold an active license [place]      Assessment/Plan:    Problem List Items Addressed This Visit        Other    Recurrent major depressive disorder, in partial remission ([facility] [address] )    Generalized anxiety disorder - Primary          Goals addressed in session: Goal 1          Reason for visit is No chief complaint on file  Encounter provider BRANDYN Kam    Provider located at [address] [place] [address]  [phone]      Recent Visits  Date Type Provider Dept   10/28/22 Telemedicine Ashtyn Bryant, [address] [facility] Therapist [facility]   Showing recent visits within past 7 days and meeting all other requirements  Future Appointments  No visits were found meeting these conditions  Showing future appointments within next 150 days and meeting all other requirements       The patient was identified by name and date of birth  Neli Gomez was informed that this is a telemedicine visit and that the visit is being [facility]  She agrees to proceed     My office door was closed  No one else was in the room  She acknowledged consent and understanding of privacy and security of the video platform  The patient has agreed to participate and understands they can discontinue the visit at any time  Patient is aware this is a billable service  Subjective  Neli Gomez is a 27 y o  female  DATA: Met with Debbie for scheduled individual session  Topics of discussion included relationship with significant other, work-related stress, education-related stress, financial issues, housing issues and substance use recovery  Client reported that she has been feeling better over the past couple days as she left the house to take time to think about what she wants   She reported staying with her brother for a few days and returned to her in-law's home because she needed to  a book she ordered for school  She discussed having an opportunity to have a conversation with her partner about their relationship and they both agreed that they want it to work  Client reported that she intends to set boundaries with her partner and discussed that she tends to have a hard time keeping the boundaries and following through with consequences, therefore nothing ever changes  Client shows evidence of utilizing CBT thought record, weighing pros and cons and effective communication skills skills to manage mental health symptoms  During this session, this clinician used the following therapeutic modalities: engagement strategies, supportive psychotherapy and solution-focused therapy  Clinician provided psychoeducation regarding use of Identifying the boundaries that she would like to set with her partner       ASSESSMENT: Concepcion Alves presents with a less depressed mood  Her affect is normal range and intensity, appropriate  Debbie exhibits early engagement with this clinician  Concepcion Alves continues to exhibit willingness to work on treatment goals and objectives  Concepcion Alves presents with a minimal risk of suicide, minimal risk of self-harm, and minimal risk of harm to others  PLAN: Concepcion Alves will return in 2 weeks for the next scheduled session  Between sessions, Concepcion Alves will continue to set boundaries with partner and practice following through, and will report back during the next session re: successes and barriers  At the next session, this clinician will use engagement strategies, supportive psychotherapy and solution-focused therapy to address co-dependency/anxiety in her relationship, in an effort to assist Debbie with meeting treatment goals  HPI     Past Medical History:   Diagnosis Date   • Anxiety    • Depression        No past surgical history on file      Current Outpatient Medications Medication Sig Dispense Refill   • busPIRone (BUSPAR) 10 mg tablet Take 10 mg by mouth 3 (three) times a day     • clindamycin (CLEOCIN T) 1 %  (Patient not taking: Reported on 4/27/2022 )     • escitalopram (LEXAPRO) 5 mg tablet Take 5 mg by mouth daily     • traZODone (DESYREL) 50 mg tablet Take 50 mg by mouth daily       No current facility-administered medications for this visit  No Known Allergies    Review of Systems    Video Exam    There were no vitals filed for this visit      Physical Exam     Visit Time    Visit Start Time: 3:00PM  Visit Stop Time: 3:46PM  Total Visit Duration: 46 minutes

## 2022-11-01 ENCOUNTER — HOSPITAL ENCOUNTER (OUTPATIENT)
Dept: ULTRASOUND IMAGING | Facility: CLINIC | Age: 32
Discharge: HOME | End: 2022-11-01

## 2022-11-01 DIAGNOSIS — N94.6 DYSMENORRHEA: ICD-10-CM

## 2022-11-08 ENCOUNTER — SOCIAL WORK (OUTPATIENT)
Dept: BEHAVIORAL/MENTAL HEALTH CLINIC | Facility: CLINIC | Age: 32
End: 2022-11-08

## 2022-11-08 DIAGNOSIS — F41.1 GENERALIZED ANXIETY DISORDER: Primary | ICD-10-CM

## 2022-11-08 DIAGNOSIS — F33.41 RECURRENT MAJOR DEPRESSIVE DISORDER, IN PARTIAL REMISSION (HCC): ICD-10-CM
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
## 2022-11-09 ENCOUNTER — HOSPITAL ENCOUNTER (EMERGENCY)
Facility: HOSPITAL | Age: 32
Discharge: HOME | End: 2022-11-09
Attending: EMERGENCY MEDICINE

## 2022-11-09 ENCOUNTER — TELEPHONE (OUTPATIENT)
Dept: PSYCHIATRY | Facility: CLINIC | Age: 32
End: 2022-11-09

## 2022-11-09 VITALS
SYSTOLIC BLOOD PRESSURE: 144 MMHG | RESPIRATION RATE: 17 BRPM | HEART RATE: 66 BPM | TEMPERATURE: 98 F | OXYGEN SATURATION: 99 % | DIASTOLIC BLOOD PRESSURE: 89 MMHG

## 2022-11-09 DIAGNOSIS — S16.1XXA STRAIN OF NECK MUSCLE, INITIAL ENCOUNTER: Primary | ICD-10-CM

## 2022-11-09 DIAGNOSIS — S10.91XA ABRASION OF NECK, INITIAL ENCOUNTER: ICD-10-CM

## 2022-11-09 RX ORDER — METHOCARBAMOL 500 MG/1
500 TABLET, FILM COATED ORAL 4 TIMES DAILY
Qty: 40 TABLET | Refills: 0 | Status: SHIPPED | OUTPATIENT
Start: 2022-11-09 | End: 2022-11-19

## 2022-11-09 NOTE — DISCHARGE INSTRUCTIONS
Warm compresses to the area  Medication as directed  FU with your family doctor, return to the ED for worsening symptoms  FU with your family doctor
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
## 2022-11-15 ENCOUNTER — TELEPHONE (OUTPATIENT)
Dept: PSYCHIATRY | Facility: CLINIC | Age: 32
End: 2022-11-15

## 2022-11-15 NOTE — TELEPHONE ENCOUNTER
Called and left message for client regarding scheduling an appointment for a Psychiatric Evaluation

## 2022-12-07 ENCOUNTER — TELEPHONE (OUTPATIENT)
Dept: BEHAVIORAL/MENTAL HEALTH CLINIC | Facility: CLINIC | Age: 32
End: 2022-12-07

## 2022-12-07 NOTE — TELEPHONE ENCOUNTER
[facility]   Client was scheduled at 3:00pm on 12/13/22 virtual

## 2022-12-13 ENCOUNTER — TELEPHONE (OUTPATIENT)
Dept: BEHAVIORAL/MENTAL HEALTH CLINIC | Facility: CLINIC | Age: 32
End: 2022-12-13

## 2022-12-13 ENCOUNTER — TELEMEDICINE (OUTPATIENT)
Dept: BEHAVIORAL/MENTAL HEALTH CLINIC | Facility: CLINIC | Age: 32
End: 2022-12-13

## 2022-12-13 DIAGNOSIS — F33.41 RECURRENT MAJOR DEPRESSIVE DISORDER, IN PARTIAL REMISSION (HCC): ICD-10-CM

## 2022-12-13 DIAGNOSIS — F41.1 GENERALIZED ANXIETY DISORDER: Primary | ICD-10-CM
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
## 2022-12-13 NOTE — TELEPHONE ENCOUNTER
PAULO contacted patient to verify if she would be joining for her appointment or if she needed to reschedule  Client reported forgetting about the appointment and will log in

## 2022-12-22 ENCOUNTER — OFFICE VISIT (OUTPATIENT)
Dept: GYNECOLOGY | Facility: CLINIC | Age: 32
End: 2022-12-22

## 2022-12-22 VITALS — BODY MASS INDEX: 22.21 KG/M2 | DIASTOLIC BLOOD PRESSURE: 64 MMHG | WEIGHT: 125.4 LBS | SYSTOLIC BLOOD PRESSURE: 104 MMHG

## 2022-12-22 DIAGNOSIS — Z97.5 CONTRACEPTION, DEVICE INTRAUTERINE: ICD-10-CM

## 2022-12-22 DIAGNOSIS — Z30.433 ENCOUNTER FOR REMOVAL AND REINSERTION OF INTRAUTERINE CONTRACEPTIVE DEVICE (IUD): ICD-10-CM

## 2022-12-22 NOTE — PROGRESS NOTES
Iud removal    Date/Time: 12/22/2022 11:57 AM  Performed by: Jeannine Ryder DO  Authorized by: Jeannine Ryder DO   Universal Protocol:  Consent: Written consent obtained    Risks and benefits: risks, benefits and alternatives were discussed  Consent given by: patient  Patient understanding: patient states understanding of the procedure being performed  Patient consent: the patient's understanding of the procedure matches consent given  Procedure consent: procedure consent matches procedure scheduled  Relevant documents: relevant documents present and verified  Patient identity confirmed: verbally with patient      Procedure:     Removed with no complications: yes      Other reason for removal:  Expiration  Comments:      IUD removed without complication intact

## 2022-12-22 NOTE — PROGRESS NOTES
Iud insertions    Date/Time: 12/22/2022 11:58 AM  Performed by: Keyonna Ambrose DO  Authorized by: Keyonna Ambrose DO     Other Assisting Provider: No    Written consent obtained?: Yes    Risks and benefits: Risks, benefits and alternatives were discussed    Consent given by:  Patient  Patient states understanding of procedure being performed: Yes    Patient's understanding of procedure matches consent: Yes    Procedure consent matches procedure scheduled: Yes    Patient identity confirmed:  Verbally with patient  Procedure:     Pelvic exam performed: yes      Negative urine pregnancy test: yes      Cervix cleaned and prepped: yes      Speculum placed in vagina: yes      Allis applied to cervix: yes      Uterus sounded: yes      Uterus sound depth (cm):  7    IUD inserted with no complications: yes      IUD type:  Mirena    Strings trimmed: yes    Post-procedure:     Patient tolerated procedure well: yes      Patient will follow up after next period: yes    Comments:      IUD placed without difficulty, there were no complications  Evi Blase

## 2022-12-28 ENCOUNTER — TELEPHONE (OUTPATIENT)
Dept: GYNECOLOGY | Facility: CLINIC | Age: 32
End: 2022-12-28

## 2022-12-29 ENCOUNTER — TELEPHONE (OUTPATIENT)
Dept: BEHAVIORAL/MENTAL HEALTH CLINIC | Facility: CLINIC | Age: 32
End: 2022-12-29

## 2022-12-29 ENCOUNTER — APPOINTMENT (OUTPATIENT)
Dept: LAB | Facility: CLINIC | Age: 32
End: 2022-12-29

## 2022-12-29 ENCOUNTER — ULTRASOUND (OUTPATIENT)
Dept: GYNECOLOGY | Facility: CLINIC | Age: 32
End: 2022-12-29

## 2022-12-29 ENCOUNTER — OFFICE VISIT (OUTPATIENT)
Dept: GYNECOLOGY | Facility: CLINIC | Age: 32
End: 2022-12-29

## 2022-12-29 VITALS — DIASTOLIC BLOOD PRESSURE: 70 MMHG | BODY MASS INDEX: 22.36 KG/M2 | WEIGHT: 126.2 LBS | SYSTOLIC BLOOD PRESSURE: 120 MMHG

## 2022-12-29 DIAGNOSIS — Z11.3 SCREENING FOR STDS (SEXUALLY TRANSMITTED DISEASES): ICD-10-CM

## 2022-12-29 DIAGNOSIS — B96.89 BV (BACTERIAL VAGINOSIS): ICD-10-CM

## 2022-12-29 DIAGNOSIS — T83.32XA INTRAUTERINE CONTRACEPTIVE DEVICE THREADS LOST, INITIAL ENCOUNTER: Primary | ICD-10-CM

## 2022-12-29 DIAGNOSIS — N76.0 BV (BACTERIAL VAGINOSIS): ICD-10-CM

## 2022-12-29 DIAGNOSIS — T83.32XA INTRAUTERINE CONTRACEPTIVE DEVICE THREADS LOST, INITIAL ENCOUNTER: ICD-10-CM

## 2022-12-29 DIAGNOSIS — Z11.3 SCREEN FOR SEXUALLY TRANSMITTED DISEASES: ICD-10-CM

## 2022-12-29 RX ORDER — CLINDAMYCIN HYDROCHLORIDE 300 MG/1
300 CAPSULE ORAL 2 TIMES DAILY
Qty: 14 CAPSULE | Refills: 0 | Status: SHIPPED | OUTPATIENT
Start: 2022-12-29 | End: 2023-01-05

## 2022-12-29 NOTE — PROGRESS NOTES
Assessment/Plan:    Diagnoses and all orders for this visit:    Intrauterine contraceptive device threads lost, initial encounter  -     US pelvis complete w transvaginal; Future    Screening for STDs (sexually transmitted diseases)  -     Chlamydia/GC amplified DNA by PCR  -     RPR; Future  -     Hepatitis B surface antigen; Future  -     Hepatitis C antibody; Future  -     HIV-1 RNA, quantitative, PCR; Future  -     RPR  -     Hepatitis B surface antigen  -     Hepatitis C antibody  -     HIV-1 RNA, quantitative, PCR    BV (bacterial vaginosis)  -     clindamycin (CLEOCIN) 300 MG capsule; Take 1 capsule (300 mg total) by mouth 2 (two) times a day for 7 days        Subjective: IUD follow-up     Patient ID: Guanakito Herbert is a 27 y o  female  HPI   34-year-old female  1 para 1, was here just last week for IUD exchange  She had a ParaGard IUD in place for 10 years,  and requested a new IUD, Mirena be placed  Her old IUD was successfully removed her new IUD was successfully placed without complication  Her strings were shortened leaving 2 inches exposed from the cervical os  Patient called today states she just does not feel right is having some discomfort with it but not pain  Does have some vaginal discharge  Wet mount was done clue cells identified consistent with bacterial vaginosis  Patient requested additional STD testing ordered  Review of Systems  Unchanged    Objective: No acute distress  /70 (BP Location: Right arm, Patient Position: Sitting, Cuff Size: Standard)   Wt 57 2 kg (126 lb 3 2 oz)   BMI 22 36 kg/m²      Physical Exam  Vitals reviewed  Exam conducted with a chaperone present  Constitutional:       Appearance: Normal appearance  She is normal weight  Eyes:      Extraocular Movements: Extraocular movements intact  Pupils: Pupils are equal, round, and reactive to light     Pulmonary:      Effort: Pulmonary effort is normal    Abdominal:      General: Abdomen is flat  Palpations: Abdomen is soft  Genitourinary:     General: Normal vulva  Comments: Normal external genitalia  Normal size uterus  Normal cervix  No CMT  No pelvic masses  Yellow vaginal discharge, clue cells identified consistent with bacterial vaginosis  IUD strings missing, not visible, not palpable, no abnormalities detected on bimanual exam   Musculoskeletal:         General: Normal range of motion  Cervical back: Normal range of motion  Skin:     General: Skin is warm and dry  Neurological:      Mental Status: She is alert and oriented to person, place, and time  Psychiatric:         Mood and Affect: Mood normal          Behavior: Behavior normal          Thought Content: Thought content normal          Judgment: Judgment normal         Addendum after thorough pelvic examination visual and palpating her uterus and cervix IUD strings not identified  No obvious cervical or uterine abnormalities detected  Patient sent to [facility] office for ultrasound to verify IUD location  Please note    In addition to the time spent discussing the findings and results of today's visit and exam, I spent approximately 20 minutes of face-to-face time with the patient, greater than 50% of which was spent in counseling and coordination of care for this patient

## 2022-12-29 NOTE — TELEPHONE ENCOUNTER
PAULO returned client's call to reschedule her appointment   Client was rescheduled for 1/9 at 2:00pm

## 2022-12-29 NOTE — PROGRESS NOTES
AMB US Pelvic Non OB    Date/Time: 12/29/2022 10:51 AM  Performed by: Freeman Alas  Authorized by: Stevo Christine DO   [place] Protocol:  Patient identity confirmed: verbally with patient      Procedure details:     Technique:  Transvaginal US, Non-OB    Position: lithotomy exam    Uterine findings:     Length (cm): 7 47    Height (cm):  4 02    Width (cm):  5 16    Endometrial stripe: identified      Endometrium thickness (mm):  8 2  Left ovary findings:     Left ovary:  Visualized    Length (cm): 2 52    Height (cm): 1 78    Width (cm): 2 57  Right ovary findings:     Right ovary:  Visualized    Length (cm): 3 92    Height (cm): 2 71    Width (cm): 2 72  Other findings:     Free pelvic fluid: not identified      Free peritoneal fluid: not identified    Post-Procedure Details:     Impression:  Retroverted uterus demonstrates an IUD in good position within the endometrium  Otherwise, the uterus and bilateral ovaries appear within normal limits  The right ovary demonstrates a 2 7GY follicle  No free fluid  Tolerance: Tolerated well, no immediate complications    Complications: no complications    Additional Procedure Comments:      [employer] F8 E8C-RS transvaginal transducer Serial # [id number] was used to perform the examination today and subsequently followed with high level disinfection utilizing Trophon EPR procedure  Ultrasound performed at:     [address] [address]  Phone:  [phone]  Fax:  [phone]

## 2022-12-30 LAB
C TRACH DNA SPEC QL NAA+PROBE: NEGATIVE
HBV SURFACE AG SER QL: NORMAL
HCV AB SER QL: NORMAL
N GONORRHOEA DNA SPEC QL NAA+PROBE: NEGATIVE
RPR SER QL: NORMAL

## 2023-01-03 ENCOUNTER — APPOINTMENT (OUTPATIENT)
Dept: LAB | Facility: HOSPITAL | Age: 33
End: 2023-01-03
Attending: OBSTETRICS & GYNECOLOGY

## 2023-01-03 ENCOUNTER — TELEPHONE (OUTPATIENT)
Dept: GYNECOLOGY | Facility: CLINIC | Age: 33
End: 2023-01-03

## 2023-01-03 DIAGNOSIS — N91.2 AMENORRHEA: Primary | ICD-10-CM

## 2023-01-03 DIAGNOSIS — N91.2 ABSENCE OF MENSTRUATION: ICD-10-CM

## 2023-01-03 LAB
B-HCG SERPL-ACNC: <1 MIU/ML (ref 0–11.6)
HIV1 RNA # SERPL NAA+PROBE: <20 COPIES/ML
HIV1 RNA SERPL NAA+PROBE-LOG#: NORMAL LOG10COPY/ML

## 2023-01-17 ENCOUNTER — TELEMEDICINE (OUTPATIENT)
Dept: BEHAVIORAL/MENTAL HEALTH CLINIC | Facility: CLINIC | Age: 33
End: 2023-01-17

## 2023-01-17 DIAGNOSIS — F41.1 GENERALIZED ANXIETY DISORDER: Primary | ICD-10-CM
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
## 2023-02-02 ENCOUNTER — OFFICE VISIT (OUTPATIENT)
Dept: GYNECOLOGY | Facility: CLINIC | Age: 33
End: 2023-02-02

## 2023-02-02 VITALS — BODY MASS INDEX: 22.6 KG/M2 | WEIGHT: 127.6 LBS | SYSTOLIC BLOOD PRESSURE: 106 MMHG | DIASTOLIC BLOOD PRESSURE: 64 MMHG

## 2023-02-02 DIAGNOSIS — Z97.5 IUD (INTRAUTERINE DEVICE) IN PLACE: Primary | ICD-10-CM

## 2023-02-02 DIAGNOSIS — T83.32XA INTRAUTERINE CONTRACEPTIVE DEVICE THREADS LOST, INITIAL ENCOUNTER: ICD-10-CM

## 2023-02-02 DIAGNOSIS — F41.9 ANXIETY AND DEPRESSION: ICD-10-CM

## 2023-02-02 DIAGNOSIS — F32.A ANXIETY AND DEPRESSION: ICD-10-CM

## 2023-02-02 NOTE — PROGRESS NOTES
Assessment/Plan:    Diagnoses and all orders for this visit:    IUD (intrauterine device) in place    Intrauterine contraceptive device threads lost, initial encounter    Anxiety and depression        Subjective: Here for 6-week string check     Patient ID: Nguyễn Chacko is a 34 y o  female  HPI   44-year-old female  1 para 1 recently here in late December for IUD exchange  She had a ParaGard IUD which was causing her pain and discomfort  This was removed and replaced with a Mirena IUD  She did have some transient pain with this but was found to have a bacterial vaginosis infection  Her symptoms have resolved was treated with clindamycin  She did have a pelvic ultrasound at the end of December which did confirm the IUD was within the endometrial cavity  On today's visit the strings could not be clearly identified  Recommend we follow-up in 3 to 6 months time to recheck placement and IUD strings  Review of Systems  Unchanged    Objective: No acute distress  /64 (BP Location: Right arm, Patient Position: Sitting, Cuff Size: Standard)   Wt 57 9 kg (127 lb 9 6 oz)   LMP 01/15/2023 (Exact Date)   BMI 22 60 kg/m²      Physical Exam  Vitals reviewed  Exam conducted with a chaperone present  Constitutional:       Appearance: Normal appearance  She is normal weight  HENT:      Head: Normocephalic  Eyes:      Extraocular Movements: Extraocular movements intact  Pupils: Pupils are equal, round, and reactive to light  Pulmonary:      Effort: Pulmonary effort is normal    Abdominal:      General: Abdomen is flat  Palpations: Abdomen is soft  Genitourinary:     General: Normal vulva  Comments: Normal external genitalia  Normal size uterus  Normal cervix IUD strings not extending from cervical os not easily palpated on bimanual exam  No CMT  No pelvic masses    Musculoskeletal:         General: Normal range of motion  Skin:     General: Skin is warm and dry     Neurological: Mental Status: She is alert and oriented to person, place, and time  Psychiatric:         Mood and Affect: Mood normal          Behavior: Behavior normal          Thought Content: Thought content normal          Judgment: Judgment normal         Please note    In addition to the time spent discussing the findings and results of today's visit and exam, I spent approximately 20 minutes of face-to-face time with the patient, greater than 50% of which was spent in counseling and coordination of care for this patient

## 2023-02-16 ENCOUNTER — TELEPHONE (OUTPATIENT)
Dept: DERMATOLOGY | Facility: CLINIC | Age: 33
End: 2023-02-16

## 2023-02-16 NOTE — TELEPHONE ENCOUNTER
I have tried to contact the patient 3 times to start her virtual visit with Dr Lor Garcia  Patient is also not on camera via my chart  Patient was called at [facility] at 7:38 AM, 7:42 AM, 7:49 AM  If patient returns call she needs to be rescheduled

## 2023-02-17 ENCOUNTER — TELEPHONE (OUTPATIENT)
Dept: BEHAVIORAL/MENTAL HEALTH CLINIC | Facility: CLINIC | Age: 33
End: 2023-02-17

## 2023-02-17 NOTE — TELEPHONE ENCOUNTER
PAULO contacted client to inform that she will be leaving the practice and to discuss transfer of care options  PAULO left a voice mail with contact information for client to return the call

## 2023-02-20 ENCOUNTER — TELEPHONE (OUTPATIENT)
Dept: GYNECOLOGY | Facility: CLINIC | Age: 33
End: 2023-02-20

## 2023-02-21 NOTE — TELEPHONE ENCOUNTER
Called patient to check. She got migraines before her Mirena but they are more frequent now, once a week or more, not relieved with Ibuprofen. She did not mention this at her 2/2/23 IUD check appointment because she hadn't connected the headaches with the IUD then. She called [facility] to become a new patient. Meanwhile she seeks Dr. Medardo Donahue advice.

## 2023-02-22 NOTE — TELEPHONE ENCOUNTER
Called patient again. She scheduled an appointment for 3/2/23 in . She wanted to wait 1 more week to see if she gets another migraine.

## 2023-02-24 ENCOUNTER — HOSPITAL ENCOUNTER (EMERGENCY)
Facility: HOSPITAL | Age: 33
Discharge: HOME | End: 2023-02-24
Attending: EMERGENCY MEDICINE

## 2023-02-24 VITALS
DIASTOLIC BLOOD PRESSURE: 74 MMHG | OXYGEN SATURATION: 98 % | TEMPERATURE: 97.7 F | BODY MASS INDEX: 23.03 KG/M2 | HEART RATE: 77 BPM | WEIGHT: 130 LBS | RESPIRATION RATE: 18 BRPM | SYSTOLIC BLOOD PRESSURE: 139 MMHG

## 2023-02-24 DIAGNOSIS — R10.2 PELVIC PAIN: Primary | ICD-10-CM

## 2023-02-24 LAB
BACTERIA UR QL AUTO: ABNORMAL /HPF
BILIRUB UR QL STRIP: NEGATIVE
CLARITY UR: CLEAR
COLOR UR: YELLOW
EXT PREGNANCY TEST URINE: NEGATIVE
EXT. CONTROL: NORMAL
GLUCOSE UR STRIP-MCNC: NEGATIVE MG/DL
HGB UR QL STRIP.AUTO: ABNORMAL
KETONES UR STRIP-MCNC: NEGATIVE MG/DL
LEUKOCYTE ESTERASE UR QL STRIP: NEGATIVE
MUCOUS THREADS UR QL AUTO: ABNORMAL
NITRITE UR QL STRIP: NEGATIVE
NON-SQ EPI CELLS URNS QL MICRO: ABNORMAL /HPF
PH UR STRIP.AUTO: 5.5 [PH]
PROT UR STRIP-MCNC: NEGATIVE MG/DL
RBC #/AREA URNS AUTO: ABNORMAL /HPF
SP GR UR STRIP.AUTO: >=1.03 (ref 1–1.03)
UROBILINOGEN UR STRIP-ACNC: <2 MG/DL
WBC #/AREA URNS AUTO: ABNORMAL /HPF

## 2023-02-25 NOTE — ED PROVIDER NOTES
History  Chief Complaint   Patient presents with   • Pelvic Pain     Pt c/o pelvic pain and right flank pain x2 days  Pt concerned her IUD is no longer in place  65-year-old female presents for evaluation of mild aching pelvic pain radiating to her lower back for the past 2 days  Pain is associated with nausea and urinary frequency  No vaginal bleeding or discharge  No fevers or chills  Patient has an IUD in place which was last evaluated by her OB/GYN earlier this month  She is scheduled for a recheck of her IUD in 6 months  Patient is sexually active  Prior to Admission Medications   Prescriptions Last Dose Informant Patient Reported? Taking?   busPIRone (BUSPAR) 10 mg tablet   Yes No   Sig: Take 10 mg by mouth 3 (three) times a day   escitalopram (LEXAPRO) 5 mg tablet   Yes No   Sig: Take 5 mg by mouth daily   traZODone (DESYREL) 50 mg tablet   Yes No   Sig: Take 50 mg by mouth daily      Facility-Administered Medications: None       Past Medical History:   Diagnosis Date   • Anxiety    • Depression        History reviewed  No pertinent surgical history  Family History   Problem Relation Age of Onset   • Heart attack Mother    • No Known Problems Father    • No Known Problems Sister    • No Known Problems Brother    • Hypertension Maternal Grandmother    • Diabetes Maternal Grandmother    • Hyperlipidemia Maternal Grandfather    • Hypertension Maternal Grandfather    • Diabetes Maternal Grandfather    • Heart attack Paternal Grandmother    • Heart disease Paternal Grandmother    • Breast cancer Paternal Grandmother    • Asthma Paternal Grandfather      I have reviewed and agree with the history as documented      E-Cigarette/Vaping   • E-Cigarette Use Never User      E-Cigarette/Vaping Substances   • Nicotine No    • THC No    • CBD No    • Flavoring No    • Other No    • Unknown No      Social History     Tobacco Use   • Smoking status: Former     Types: Cigarettes     Quit date: 4/27/2019 Years since quitting: 3 8   • Smokeless tobacco: Never   Vaping Use   • Vaping Use: Never used   Substance Use Topics   • Alcohol use: Not Currently   • Drug use: Not Currently       Review of Systems    Physical Exam  Physical Exam  Vitals and nursing note reviewed  Constitutional:       Appearance: Normal appearance  HENT:      Mouth/Throat:      Mouth: Mucous membranes are moist    Eyes:      Conjunctiva/sclera: Conjunctivae normal    Cardiovascular:      Rate and Rhythm: Normal rate and regular rhythm  Pulses: Normal pulses  Pulmonary:      Effort: Pulmonary effort is normal  No respiratory distress  Abdominal:      General: There is no distension  Palpations: Abdomen is soft  Tenderness: There is no abdominal tenderness  Skin:     General: Skin is warm and dry  Neurological:      Mental Status: She is alert           Vital Signs  ED Triage Vitals [02/24/23 2000]   Temperature Pulse Respirations Blood Pressure SpO2   97 7 °F (36 5 °C) 77 18 139/74 98 %      Temp Source Heart Rate Source Patient Position - Orthostatic VS BP Location FiO2 (%)   Oral Monitor Sitting Left arm --      Pain Score       --           Vitals:    02/24/23 2000   BP: 139/74   Pulse: 77   Patient Position - Orthostatic VS: Sitting         Visual Acuity      ED Medications  Medications - No data to display    Diagnostic Studies  Results Reviewed     Procedure Component Value Units Date/Time    Urine Microscopic [[id number]]  (Abnormal) Collected: 02/24/23 2012    Lab Status: Final result Specimen: Urine, Clean Catch Updated: 02/24/23 2052     RBC, UA 2-4 /hpf      WBC, UA 0-1 /hpf      Epithelial Cells Moderate /hpf      Bacteria, UA Occasional /hpf      MUCUS THREADS Occasional    UA w Reflex to Microscopic w Reflex to Culture [[id number]]  (Abnormal) Collected: 02/24/23 2012    Lab Status: Final result Specimen: Urine, Clean Catch Updated: 02/24/23 2051     Color, UA Yellow     Clarity, UA Clear     Specific Gravity, UA >=1 030     pH, UA 5 5     Leukocytes, UA Negative     Nitrite, UA Negative     Protein, UA Negative mg/dl      Glucose, UA Negative mg/dl      Ketones, UA Negative mg/dl      Urobilinogen, UA <2 0 mg/dl      Bilirubin, UA Negative     Occult Blood, UA Trace    Chlamydia/GC amplified DNA by PCR [[id number]] Collected: 02/24/23 2043    Lab Status: In process Specimen: Urine, Other Updated: 02/24/23 2046    POCT pregnancy, urine [[id number]]  (Normal) Resulted: 02/24/23 2016    Lab Status: Final result Updated: 02/24/23 2016     EXT Preg Test, Ur Negative     Control Valid                 No orders to display              Procedures  Procedures         ED Course                                             Medical Decision Making  39-year-old female presents for evaluation of pelvic pain radiating to the lower back associated with urinary frequency and nausea  Vital signs within normal limits  No abdominal tenderness on exam   No reported vaginal discharge  UA unremarkable  GC/chlamydia PCR pending  Patient to follow-up with her OB/GYN  Return precautions provided  Pelvic pain: acute illness or injury  Amount and/or Complexity of Data Reviewed  Labs: ordered  Disposition  Final diagnoses:   Pelvic pain     Time reflects when diagnosis was documented in both MDM as applicable and the Disposition within this note     Time User Action Codes Description Comment    2/24/2023  8:56 PM Marine Moore Add [R10 2] Pelvic pain       ED Disposition     ED Disposition   Discharge    Condition   Stable    Date/Time   Fri Feb 24, 2023  8:56 PM    Comment   Debbie Yeung discharge to home/self care                 Follow-up Information     Follow up With Specialties Details Why Contact Info Additional Fartun Chaves [address]  [facility] Gynecology Schedule an appointment as soon as possible for a visit in 1 week for re-evaluation [address] [place] [address] [address] [facility], [address] [place], [place], [address]      [address] Emergency Department Emergency Medicine Go to  If symptoms worsen [address] [place], [id number]  [address] [facility] Emergency Department, [address] [facility] [address]          Patient's Medications   Discharge Prescriptions    No medications on file       No discharge procedures on file      PDMP Review     None          ED Provider  Electronically Signed by           Myrna Velasquez MD  02/24/23 6079

## 2023-02-26 LAB
C TRACH DNA SPEC QL NAA+PROBE: NEGATIVE
N GONORRHOEA DNA SPEC QL NAA+PROBE: NEGATIVE

## 2023-02-27 ENCOUNTER — DOCUMENTATION (OUTPATIENT)
Dept: BEHAVIORAL/MENTAL HEALTH CLINIC | Facility: CLINIC | Age: 33
End: 2023-02-27

## 2023-02-27 ENCOUNTER — TELEMEDICINE (OUTPATIENT)
Dept: DERMATOLOGY | Facility: CLINIC | Age: 33
End: 2023-02-27

## 2023-02-27 VITALS — WEIGHT: 130 LBS | HEIGHT: 62 IN | BODY MASS INDEX: 23.92 KG/M2

## 2023-02-27 DIAGNOSIS — L70.0 ACNE VULGARIS: Primary | ICD-10-CM

## 2023-02-27 RX ORDER — DOXYCYCLINE HYCLATE 100 MG/1
100 CAPSULE ORAL EVERY 12 HOURS SCHEDULED
Qty: 60 CAPSULE | Refills: 2 | Status: SHIPPED | OUTPATIENT
Start: 2023-02-27 | End: 2023-05-28

## 2023-02-27 NOTE — PROGRESS NOTES
[facility] Note     Patient Name: Steven Le  Encounter Date: 02/27/2023     Have you been cared for by a [facility] in the last 3 years and, if so, which description applies to you? NO  I am considered a "new" patient and must complete all patient intake questions  I am FEMALE/of child-bearing potential     REVIEW OF SYSTEMS:  Have you recently had or currently have any of the following? · Recent fever or chills? No  · Any non-healing wound? No  · Are you pregnant or planning to become pregnant? No  · Are you currently or planning to be nursing or breast feeding? No   PAST MEDICAL HISTORY:  Have you personally ever had or currently have any of the following? If "YES," then please provide more detail  · Skin cancer (such as Melanoma, Basal Cell Carcinoma, Squamous Cell Carcinoma? No  · Tuberculosis, HIV/AIDS, Hepatitis B or C: No  · Systemic Immunosuppression such as Diabetes, Biologic or Immunotherapy, Chemotherapy, Organ Transplantation, Bone Marrow Transplantation No  · Radiation Treatment No   FAMILY HISTORY:  Any "first degree relatives" (parent, brother, sister, or child) with the following? • Skin Cancer, Pancreatic or Other Cancer? No   PATIENT EXPERIENCE:    • Do you want the Dermatologist to perform a COMPLETE skin exam today including a clinical examination under the "bra and underwear" areas? NO  • If necessary, do we have your permission to call and leave a detailed message on your Preferred Phone number that includes your specific medical information? Yes      No Known Allergies   Current Outpatient Medications:   •  busPIRone (BUSPAR) 10 mg tablet, Take 10 mg by mouth 3 (three) times a day, Disp: , Rfl:   •  escitalopram (LEXAPRO) 5 mg tablet, Take 5 mg by mouth daily, Disp: , Rfl:   •  traZODone (DESYREL) 50 mg tablet, Take 50 mg by mouth daily, Disp: , Rfl:           • Whom besides the patient is providing clinical information about today's encounter? o NO ADDITIONAL HISTORIAN (patient alone provided history)    Physical Exam and Assessment/Plan by Diagnosis:        Virtual Regular Visit    Verification of patient location:    Patient is located in the following state in which I hold an active license [place]      Assessment/Plan:    Problem List Items Addressed This Visit    None           Reason for visit is   Chief Complaint   Patient presents with   • Virtual Regular Visit        Encounter provider Aidan Gibson MD    Provider located at [address] [place] [address] Drive  [facility]  [facility] [address] [place] [address]  [phone]      Recent Visits  No visits were found meeting these conditions  Showing recent visits within past 7 days and meeting all other requirements  Today's Visits  Date Type Provider Dept   02/27/23 Telemedicine Aidan Gibson MD  D[facility] [place]   Showing today's visits and meeting all other requirements  Future Appointments  No visits were found meeting these conditions  Showing future appointments within next 150 days and meeting all other requirements       The patient was identified by name and date of birth  Mando Ramos was informed that this is a telemedicine visit and that the visit is being conducted through the [address] [employer] platform  She agrees to proceed     My office door was closed  No one else was in the room  She acknowledged consent and understanding of privacy and security of the video platform  The patient has agreed to participate and understands they can discontinue the visit at any time  Patient is aware this is a billable service  Subjective  Debbie Yeung is a 34 y o  female Acne   HPI     Past Medical History:   Diagnosis Date   • Acne    • Anxiety    • Depression        History reviewed  No pertinent surgical history      Current Outpatient Medications   Medication Sig Dispense Refill   • busPIRone (BUSPAR) 10 mg tablet Take 10 mg by mouth 3 (three) times a day     • escitalopram (LEXAPRO) 5 mg tablet Take 5 mg by mouth daily     • traZODone (DESYREL) 50 mg tablet Take 50 mg by mouth daily       No current facility-administered medications for this visit  No Known Allergies    Review of Systems    Video Exam    Vitals:    02/27/23 1432   Weight: 59 kg (130 lb)   Height: 5' 2" (1 575 m)       Physical Exam     I spent 15 minutes directly with the patient during this visit     Acne     Physical Exam:  · Anatomic Location Affected &  Morphological Description:  Comedones, papules, pustules, cysts on the face, jawline/neck  Scarring not identified  · Pertinent Positives:  · Pertinent Negatives: Additional History of Present Condition:  Patient has had acne since years affecting the following body sites: face, chin, back*  Thus far, they have tried the following treatments: clindamycin wipes for years  She has very sensitive skin and breaks out with overly hydrating creams and thinks Dove caused her to flare recently  She recently had Mirena placed but did not think it worsened her acne  Patient reports flares before or during menstrual cycle: yes    Patient currently taking any birth control or hormonal supplements: yes  If on birth control, what type: Mirena    Patient planning to conceive within 4-6 months: no              Assessment and Plan:   • Reviewed the causes of acne, the “kinds” of acne, and the expected clinical course  • Discussed treatment options ranging from over-the-counter products, topical retinoids, antibiotics, BP, hormonal therapies (OCPs/spironolactone), and isotretinoin (Accutane)  • After lengthy discussion of etiology and treatment options, we decided to implement the following personalized treatment plan:    ACNE TREATMENT PLAN:      Morning Routine:     Wash:   Face, back, chest: Benzoyl Peroxide Wash, advised 2- 3 % strength and use in shower   Leave this wash on your skin for about 5 minutes and then rinse it off completely while in the shower  If you do not rinse it off completely, then it will bleach your towels or clothing  Moisturize:   Advised use of daily SPF 30+ sunblock    Acne Pill:  Take prescribed doxycycline 100mg  The risks of doxycycline were discussed at length including nausea/vomiting/diarrhea, abdominal pain, photosensitivity, esophagitis, and bacterial resistance  The patient was instructed to take the medication with a full meal and a glass of water, and not to lie down for 1 hour after taking  Remember to always take your acne pills with lots of water! A pill stuck in your throat can cause significant burning and irritation  Drink a full glass of water to ensure the pill gets into your stomach  Avoid “popping” a pill right before bed or a nap, and stay upright for at least 1 hour after taking a pill  Nightly Routine:    Wash:  Face: With gentle cleanser such as Dove or Cerave or Cetaphil  If showering more than once daily, can use to back and chest as well  Medicate:   Face: Tretinoin 0 025% cream  Before bedtime (after washing your face and allowing the skin to completely dry), spread only a single pea-sized amount of this medication    evenly over your entire face (avoiding your eyes or mouth)  If too irritating, advised to space out to every other night or every 3rd night and slowly increase frequency of use to nightly  Can also use on back and chest if you have these areas involved with acne  Moisturize:   If you are using other moisturizing products such as facial oils, use them at this step  Make sure they are NON-COMEDOGENIC (not pore clogging) otherwise they may be making your acne worse  We recommend a nightly gentle cream moisturizer such as Cetaphil or Cerave facial moisturizers  Again make usre if you do choose another brand that the cream is non-comedogenic  Acne Pill:  Take prescribed doxycycline 100mg    Patient was fully counseled on spironolactone but declines it today  Can consider adding in 3 months at her follow up, or escalate to Accutane prn  Remember to always take your acne pills with lots of water! A pill stuck in your throat can cause significant burning and irritation  Drink a full glass of water to ensure the pill gets into your stomach  Avoid “popping” a pill right before bed, and stay upright for at least 1 hour after taking a pill      --------------------------------------------------------------------------------------   ANTIMICROBIAL BENZOYL PEROXIDE (BPO) options:  Below are a few options of BPO washes you may purchase  This medication is available without prescription (over-the-counter) in most drug stores or at [facility] for about $7 a bottle  It is available at a variety of strengths from 2% to 10%  Be sure to select the percentage recommended by us in your acne plan as discussed at your visit  Neutrogena Clear Pore (Benzoyl Peroxide 3% wash)         PanOxyl wash (2 5% or 4-5% preferred)     --------------------------------------------------------------------------------------        PATIENT INFORMATION       USING YOUR TOPICAL TREATMENTS LIKE A PRO  • Apply topical medications only to clean, dry skin  Topical medications may lead to significant dryness of the affected areas  To minimize this, wait 15-20 minutes after washing before applying your topical medication  • These medications work deep in the skin to prevent new breakouts  “Spot treatment” of individual pimples does not do much  When applying topical medications to the face, use the “5-dot” method  Start by placing a small pea-sized amount of the medication on your finger  Then, place “dots” in each of five locations of your face: Mid-forehead, each cheek, nose, and chin  Next, rub the medication into the entire area of skin - not just on individual pimples! Try to avoid the delicate skin around your eyes and corners of your mouth  • The medications are not magic!   They take weeks if not months to work  Be patient and use your medicine on a daily basis or as directed for six weeks before asking if your skin looks better  Try not to miss more than one or two days each week when using your medications  • If you are starting a new medication, then try using it “every other night” or even “every third night ” Gradually work up to [facility] a day ”  This will give your skin time to adjust   • The same medications often come in various forms or formulations: Creams, ointments, lotions, gels, microspheres, or foams  Use the formulation that has been recommended and don't switch to other forms unless instructed  Some forms (such as alcohol based gels) may be more drying and less tolerable for certain skin types  • Sometimes individual medications are not as effective as a combination of two or more agents  The doctor may need to try several medications or combinations before finding the one that is best for that patient  • Moisturizer, sunscreen, and make-up may be used in conjunction with topical acne medications  In general, acne medications are applied first so they may directly contact the skin  Ask your physician to review specific application instructions! • It is especially important to always use sunscreen when using a topical retinoid or oral antibiotic  These drugs can make your skin more sensitive to the sun  In general, sunscreen gets applied AFTER any acne medications  • Don't stop using your acne medications just because your acne got better  Remember, the acne is better because of the medication, and prevention is the key to treatment  Follow up in 3 months, sooner for concerns  HAVING PROBLEMS WITH ANY OF YOUR TREATMENTS? You should not be able to see any of the medicines on your face   If you can see a white film on your skin after you apply the medication, there is too much medicine in that area and you need to apply a thinner coat and make sure it is spread evenly on your face  If your skin gets too dry, you can apply a light (“non-comedogenic”) moisturizer on top of your medicine or you may switch to using the medicine every other day instead of every day  If your skin is still too irritated, you may need to switch to a milder medication  If your skin is red and very itchy, you may be allergic to the medication and you should stop using it  COMMON POSSIBLE SIDE EFFECTS OF MEDICATIONS    • Retinoids - dryness, redness, increased sun sensitivity  • Benzoyl peroxide - drying, redness, bleaching of clothes, towels and sheets, allergy  • Doxycycline - headaches; dizziness; irritation of the throat; nail changes; discoloration of teeth  • Sun sensitivity - even if you have dark skin, this medicine can make you burn more easily  Make sure you protect yourself from the sun, either by avoiding being outside between 11 AM and 3 PM, wearing and reapplying sunscreen/sunblock, or wearing sun protective clothing  • Nausea/vomiting - if you experience nausea with this medication, take it with food  • Minocycline - headaches; dizziness; vision problems,  irritation of the throat; discoloration of scars, gums, or teeth  Can rarely cause liver disease, joint pains, and flu-like symptoms  • If you should notice yellowing of the skin or any of the above, notify your doctor and stop using the medication  • Birth Control Pills - nausea; headaches; breast tenderness; feeling bloated; mood changes  • Spotting between periods may occur for the first three weeks of the medication, but this is not serious  It may last for two or three cycles  Please call us if the bleeding is heavier than a light flow or lasts for more than a few days  WHEN AND WHERE TO CALL WITH CONCERNS  We are here to help! If you experience any unusual symptoms, then stop taking or using the medication and call our office at [phone] (SKIN)  It is better to be safe than to be sorry!       Make sure all products you use on face are labeled as "non-comedongenic" or "oil-free" or " does not clog pores"  REMEMBER: Acne can be frustrating and difficult to treat  Most acne regimens take 2-3 months to see an improvement, so stick with them  Don't give up! As always, call your doctor if you have any concerns about your medications      Scribe Attestation    I,:  Elysia Hernandez am acting as a scribe while in the presence of the attending physician :       I,:  Magdalena Leiva MD personally performed the services described in this documentation    as scribed in my presence :

## 2023-02-27 NOTE — PROGRESS NOTES
[facility]    Patient Name Rickie Gloria     Date of Birth: 34 y o  1990      MRN: [id number]    Admission Date: 10/4/22    Date of Transfer: February 27, 2023    Admission Diagnosis:     1  Generalized Anxiety Disorder    Current Diagnosis:     No diagnosis found  Reason for Admission: Debbie presented for treatment due to anxiety symptoms  Primary complaints included ANXIETY SYMPTOMS: worrying daily, poor concentration, feeling agitated  Progress in Treatment: Debbie was seen for Individual Couseling  During the course of treatment she met with therapist 5 times and was engaging in sessions and making progress to reach her treatment goals  Episodes of Higher Level of Care: No    Transfer request Initiated by: Psychiatrist: None Therapist: Ivan Mcnamara    Reason for Transfer Request: clinician leaving practice    Does this individual need a clinician with specialized training/expertise?: No    Is this client working with any other [facility] Providers/Therapists?  Psychiatrist: None Therapist: None    Other pertinent issues: past drug addiction    Are there any specific individuals who would be a “best fit” or who have already agreed to accept this transfer request?      Psychiatrist: None   Therapist: None  Rationale: Not Applicable    Attempts to maintain the current therapeutic relationship: Yes, but therapist leaving the practice    Transfer request routed to Clinical Coordinator for input and/or approval      Comments from other involved providers and/or clinical coordinator: None    Ivan Mcnamara, LSW02/27/23

## 2023-03-15 ENCOUNTER — TELEPHONE (OUTPATIENT)
Dept: PSYCHIATRY | Facility: CLINIC | Age: 33
End: 2023-03-15

## 2023-03-15 NOTE — TELEPHONE ENCOUNTER
Called pt in regards to YOGESH from [address] [place] [address] for pt to contact intake at [facility]

## 2023-03-24 NOTE — TELEPHONE ENCOUNTER
2nd attempt to call pt in regards to transfer of care from [facility] to call intake dept

## 2023-04-20 ENCOUNTER — TELEMEDICINE (OUTPATIENT)
Dept: BEHAVIORAL/MENTAL HEALTH CLINIC | Facility: CLINIC | Age: 33
End: 2023-04-20

## 2023-04-20 DIAGNOSIS — F41.1 GENERALIZED ANXIETY DISORDER: Primary | ICD-10-CM

## 2023-04-20 DIAGNOSIS — F43.10 POST TRAUMATIC STRESS DISORDER (PTSD): ICD-10-CM

## 2023-04-20 NOTE — PSYCH
Virtual Regular Visit    Verification of patient location:    Patient is located at Home in the following state in which I hold an active license Other; Currently working towards [place] licensure       Assessment/Plan:    Problem List Items Addressed This Visit        Other    Generalized anxiety disorder - Primary   Other Visit Diagnoses     Post traumatic stress disorder (PTSD)              Goals addressed in session: Goal 1          Reason for visit is   Chief Complaint   Patient presents with   • Virtual Regular Visit        Encounter provider Keisha Alfaro    Provider located at [address] [place] [address]  [phone]      Recent Visits  No visits were found meeting these conditions  Showing recent visits within past 7 days and meeting all other requirements  Today's Visits  Date Type Provider Dept   04/20/23 Telemedicine [facility]rap[facility]   Showing today's visits and meeting all other requirements  Future Appointments  No visits were found meeting these conditions  Showing future appointments within next 150 days and meeting all other requirements       The patient was identified by name and date of birth  Sowmya Loco was informed that this is a telemedicine visit and [facility] the [facility]  She agrees to proceed     My office door was closed  No one else was in the room  She acknowledged consent and understanding of privacy and security of the video platform  The patient has agreed to participate and understands they can discontinue the visit at any time  Patient is aware this is a billable service  Subjective  Sowmya Loco is a 34 y o  female   HPI     Past Medical History:   Diagnosis Date   • Acne    • Anxiety    • Depression        No past surgical history on file      Current Outpatient Medications   Medication Sig Dispense Refill   • busPIRone (BUSPAR) 10 mg tablet Take 10 mg by mouth 3 (three) times a day     • doxycycline hyclate (VIBRAMYCIN) 100 mg capsule Take 1 capsule (100 mg total) by mouth every 12 (twelve) hours Please take with food and water and do not lie down for one hour  Be cautious in sun due to sunburn risk 60 capsule 2   • escitalopram (LEXAPRO) 5 mg tablet Take 5 mg by mouth daily     • traZODone (DESYREL) 50 mg tablet Take 50 mg by mouth daily     • tretinoin (RETIN-A) 0 025 % cream Apply pea sized amount to entire face nightly 45 g 3     No current facility-administered medications for this visit  No Known Allergies    Review of Systems    Video Exam    There were no vitals filed for this visit  Physical Exam     Behavioral Health Psychotherapy Progress Note    Psychotherapy Provided: Individual Psychotherapy     1  Generalized anxiety disorder        2  Post traumatic stress disorder (PTSD)            Goals addressed in session: Goal 1     DATA: Client presented for a telehealth session via [address]  Client is a transfer from another clinician  Client reported wanting to continue to work on managing anxiety and depression  She addressed struggling with co-dependency, stating that she has been in a relationship with the same person for about fifteen years  She expressed having a 17-year old son with this person  Client disclosed that he is a drug addict who is currently on parole  He is court mandated to seek inpatient drug and alcohol treatment, but has not been following through  Client talked in-length about their relationship and how she is constantly doing things for him (paying his bills, having poor boundaries with him, visiting him regularly etc ) 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  Client acknowledged her struggle with co-dependency and wanting to work on finding ways to move forward   Client spoke about her challenges with addiction "and how she managed to overcome it  Client disclosed wanting to look into trauma counseling  Discussed the possibility of connecting to EMDR  During this session, this clinician used the following therapeutic modalities: Client-centered Therapy and Cognitive Behavioral Therapy    Substance Abuse was not addressed during this session  If the client is diagnosed with a co-occurring substance use disorder, please indicate any changes in the frequency or amount of use: N/A  Stage of change for addressing substance use diagnoses: No substance use/Not applicable    ASSESSMENT:  Debbie Yeung presents with a Euthymic/ normal mood  her affect is Normal range and intensity, which is congruent, with her mood and the content of the session  The client has made progress on their goals  Tiff Albrecht presents with a low risk of suicide, low risk of self-harm, and low risk of harm to others  For any risk assessment that surpasses a \"low\" rating, a safety plan must be developed  A safety plan was indicated: no  If yes, describe in detail N/A    PLAN: Between sessions, Tiff Albrecht will review material e-mailed regarding topics discussed  Will review next session  At the next session, the therapist will use Client-centered Therapy and Cognitive Behavioral Therapy to address anxiety  Behavioral Health Treatment Plan and Discharge Planning: Tiff Albrecht is aware of and agrees to continue to work on their treatment plan  They have identified and are working toward their discharge goals   yes    Visit start and stop times:    04/20/23  Start Time: 1000  Stop Time: 1055  Total Visit Time: 55 minutes      "

## 2023-05-02 ENCOUNTER — TELEPHONE (OUTPATIENT)
Dept: PSYCHIATRY | Facility: CLINIC | Age: 33
End: 2023-05-02

## 2023-05-04 ENCOUNTER — TELEPHONE (OUTPATIENT)
Dept: PSYCHIATRY | Facility: CLINIC | Age: 33
End: 2023-05-04

## 2023-05-04 NOTE — TELEPHONE ENCOUNTER
Was unable to leave a message as mailbox is full  Gene sight testing will be forwarded to clients house for her to complete  Clt would like to be scheduled for psychiatry  Clt is a priority client and should be scheduled with Dr Fidel Castañeda when client can be reached

## 2023-06-13 ENCOUNTER — TELEMEDICINE (OUTPATIENT)
Dept: BEHAVIORAL/MENTAL HEALTH CLINIC | Facility: CLINIC | Age: 33
End: 2023-06-13
Payer: COMMERCIAL

## 2023-06-13 DIAGNOSIS — F43.10 POST TRAUMATIC STRESS DISORDER (PTSD): ICD-10-CM

## 2023-06-13 DIAGNOSIS — F41.1 GENERALIZED ANXIETY DISORDER: Primary | ICD-10-CM

## 2023-06-13 PROCEDURE — 90837 PSYTX W PT 60 MINUTES: CPT | Performed by: SOCIAL WORKER

## 2023-06-13 NOTE — PSYCH
Virtual Regular Visit    Verification of patient location:    Patient is located at Home in the following state in which I hold an active license Other; [place]       Assessment/Plan:    Problem List Items Addressed This Visit    None      Goals addressed in session: Goal 1          Reason for visit is   Chief Complaint   Patient presents with   • Virtual Regular Visit        Encounter provider Catalino Riding    Provider located at [address] [place] [address] [address]  [address] [place] [address]  [phone]      Recent Visits  No visits were found meeting these conditions  Showing recent visits within past 7 days and meeting all other requirements  Future Appointments  No visits were found meeting these conditions  Showing future appointments within next 150 days and meeting all other requirements       The patient was identified by name and date of birth  Marian Israel was informed that this is a telemedicine visit and that [facility]  She agrees to proceed     My office door was closed  No one else was in the room  She acknowledged consent and understanding of privacy and security of the video platform  The patient has agreed to participate and understands they can discontinue the visit at any time  Patient is aware this is a billable service  Subjective  Marian Israel is a 34 y o  female   HPI     Past Medical History:   Diagnosis Date   • Acne    • Anxiety    • Depression        No past surgical history on file      Current Outpatient Medications   Medication Sig Dispense Refill   • busPIRone (BUSPAR) 10 mg tablet Take 10 mg by mouth 3 (three) times a day     • escitalopram (LEXAPRO) 5 mg tablet Take 5 mg by mouth daily     • traZODone (DESYREL) 50 mg tablet Take 50 mg by mouth daily     • tretinoin (RETIN-A) 0 025 % cream Apply pea sized amount to "entire face nightly 45 g 3     No current facility-administered medications for this visit  No Known Allergies    Review of Systems    Video Exam    There were no vitals filed for this visit  Physical Exam     Behavioral Health Psychotherapy Progress Note    Psychotherapy Provided: Individual Psychotherapy     1  Generalized anxiety disorder        2  Post traumatic stress disorder (PTSD)            Goals addressed in session: Goal 1     DATA: Client presented for a telehealth session via [address]  Client reported ongoing stressors with her son's father and how she is having a hard time \"letting go  \" Client expressed that he is toxic, manipulative and does not seem to want to change  Client disclosed a history of addiction and how he blames her for what he does, or says things that he knows will effect her  Client noted that he has a call later today to discuss his two options: go to rehab or long-term  Client talked about feeling guilty if she does not respond to his text messages and/or phone calls  She has attempted to create and establish boundaries, but has had a hard time following through  She acknowledged that he does not take her seriously when she has threatened to leave  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  She emphasized the importance of needing to focus on school and her future career  Clinician actively listened, provided emotional support, validated client's feelings around her ex  Discussed in-length themes of codependency, prioritizing her son and his safety/wellbeing, not wanting to jeopardize her schooling/career and that she is a recovering addict herself  Talked about what is within her control and stages of change  During this session, this clinician used the following therapeutic modalities: Client-centered Therapy and Cognitive Behavioral Therapy    Substance Abuse was not addressed during this session   If the client is diagnosed " "with a co-occurring substance use disorder, please indicate any changes in the frequency or amount of use: N/A  Stage of change for addressing substance use diagnoses: No substance use/Not applicable    ASSESSMENT:  Debbie Yeung presents with a Euthymic/ normal mood  her affect is Normal range and intensity, which is congruent, with her mood and the content of the session  The client has made progress on their goals  Rizwan Mcneil presents with a low risk of suicide, low risk of self-harm, and low risk of harm to others  For any risk assessment that surpasses a \"low\" rating, a safety plan must be developed  A safety plan was indicated: no  If yes, describe in detail N/A    PLAN: Between sessions, Rizwan Mcneil will continue to utilize coping skills to manage stress/anxiety  Will work on boundary setting and how to implement these boundaries  At the next session, the therapist will use Client-centered Therapy and Cognitive Behavioral Therapy to address anxiety and removing self from toxic people  Behavioral Health Treatment Plan and Discharge Planning: Rizwan Mcneil is aware of and agrees to continue to work on their treatment plan  They have identified and are working toward their discharge goals   yes    Visit start and stop times:    06/13/23  Start Time: 1104  Stop Time: 1200  Total Visit Time: 56 minutes      "

## 2023-06-20 ENCOUNTER — TELEPHONE (OUTPATIENT)
Dept: PSYCHIATRY | Facility: CLINIC | Age: 33
End: 2023-06-20

## 2023-06-20 NOTE — TELEPHONE ENCOUNTER
[employer] test order placed  They will ship test kit to patient within 48 hours

## 2023-08-14 ENCOUNTER — TELEMEDICINE (OUTPATIENT)
Dept: BEHAVIORAL/MENTAL HEALTH CLINIC | Facility: CLINIC | Age: 33
End: 2023-08-14
Payer: COMMERCIAL

## 2023-08-14 DIAGNOSIS — F41.1 GENERALIZED ANXIETY DISORDER: Primary | ICD-10-CM

## 2023-08-14 PROCEDURE — 90834 PSYTX W PT 45 MINUTES: CPT | Performed by: SOCIAL WORKER

## 2023-08-14 NOTE — PSYCH
Virtual Regular Visit    Verification of patient location:    Patient is located at Home in the following state in which I hold an active license Other; [place]       Assessment/Plan:    Problem List Items Addressed This Visit        Other    Generalized anxiety disorder - Primary       Goals addressed in session: Goal 1          Reason for visit is   Chief Complaint   Patient presents with   • Virtual Regular Visit        Encounter provider José Miguel Patiño    Provider located at [id number] [place] [address]  [address]  [phone]      Recent Visits  No visits were found meeting these conditions. Showing recent visits within past 7 days and meeting all other requirements  Today's Visits  Date Type Provider Dept   08/14/23 Telemedicine [address] Therapist [facility]   Showing today's visits and meeting all other requirements  Future Appointments  No visits were found meeting these conditions. Showing future appointments within next 150 days and meeting all other requirements       The patient was identified by name and date of birth. Delilah Rodriguez was informed that this is a telemedicine visit and [facility] She agrees to proceed. .  My office door was closed. No one else was in the room. She acknowledged consent and understanding of privacy and security of the video platform. The patient has agreed to participate and understands they can discontinue the visit at any time. Patient is aware this is a billable service. Subjective  Delilah Rodriguez is a 34 y.o. female . HPI     Past Medical History:   Diagnosis Date   • Acne    • Anxiety    • Depression        No past surgical history on file.     Current Outpatient Medications   Medication Sig Dispense Refill   • busPIRone (BUSPAR) 10 mg tablet Take 10 mg by mouth 3 (three) times a day     • escitalopram (LEXAPRO) 5 mg tablet Take 5 mg by mouth daily     • traZODone (DESYREL) 50 mg tablet Take 50 mg by mouth daily     • tretinoin (RETIN-A) 0.025 % cream Apply pea sized amount to entire face nightly 45 g 3     No current facility-administered medications for this visit. No Known Allergies    Review of Systems    Video Exam    There were no vitals filed for this visit. Physical Exam     Behavioral Health Psychotherapy Progress Note    Psychotherapy Provided: Individual Psychotherapy     1. Generalized anxiety disorder            Goals addressed in session: Goal 1     DATA: Client presented for a telehealth session via [facility]. Client reported that she recently graduated from [address] [place] [address] [address] on Thursday (8/10). 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 She expressed feeling confident and hopeful that she will do well on the exam. She mentioned taking several practice tests and doing well on the practice exams. Client voiced how it has taken her 14 years to get to this point, and how she hopes she can help motivate someone else to not give up. 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 Client highlighted that she has a vacation scheduled for September, and how she will not be starting her new job until afterwards. 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 Client talked briefly about her son's father and how he spent 28 days in [facility] and relapsed when he was discharged. Client disclosed that her son has a psych evaluation tomorrow in [facility]. She mentioned that he totaled his grandmother's car and is on probation. Client would like to try and do something fun before school starts.  Clinician actively listened, provided emotional support, validated client's feelings, shared in her excitement around graduating nursing school, inquired about her new job and nose job and explored coping skills. During this session, this clinician used the following therapeutic modalities: Client-centered Therapy and Cognitive Behavioral Therapy    Substance Abuse was not addressed during this session. If the client is diagnosed with a co-occurring substance use disorder, please indicate any changes in the frequency or amount of use: N/A. Stage of change for addressing substance use diagnoses: No substance use/Not applicable    ASSESSMENT:  Debbie Yeung presents with a Euthymic/ normal mood. her affect is Normal range and intensity, which is congruent, with her mood and the content of the session. The client has made progress on their goals. Vashti Walter presents with a low risk of suicide, low risk of self-harm, and low risk of harm to others. For any risk assessment that surpasses a "low" rating, a safety plan must be developed. A safety plan was indicated: no  If yes, describe in detail N/A    PLAN: Between sessions, Vashti Walter will continue to utilize coping skills to manage stress/anxiety. At the next session, the therapist will use Client-centered Therapy and Cognitive Behavioral Therapy to address anxiety. Behavioral Health Treatment Plan and Discharge Planning: Vashti Walter is aware of and agrees to continue to work on their treatment plan. They have identified and are working toward their discharge goals.  yes    Visit start and stop times:    08/14/23  Start Time: 1501  Stop Time: 9118  Total Visit Time: 52 minutes

## 2023-09-06 ENCOUNTER — APPOINTMENT (OUTPATIENT)
Dept: LAB | Facility: CLINIC | Age: 33
End: 2023-09-06
Payer: COMMERCIAL

## 2023-09-06 DIAGNOSIS — Z13.220 SCREENING FOR LIPOID DISORDERS: ICD-10-CM

## 2023-09-06 DIAGNOSIS — F41.9 ANXIETY HYPERVENTILATION: ICD-10-CM

## 2023-09-06 DIAGNOSIS — F32.1 MAJOR DEPRESSIVE DISORDER, SINGLE EPISODE, MODERATE (HCC): ICD-10-CM

## 2023-09-06 DIAGNOSIS — F45.8 ANXIETY HYPERVENTILATION: ICD-10-CM

## 2023-09-06 LAB
25(OH)D3 SERPL-MCNC: 13 NG/ML (ref 30–100)
ALBUMIN SERPL BCP-MCNC: 4.2 G/DL (ref 3.5–5)
ALP SERPL-CCNC: 44 U/L (ref 34–104)
ALT SERPL W P-5'-P-CCNC: 37 U/L (ref 7–52)
ANION GAP SERPL CALCULATED.3IONS-SCNC: 8 MMOL/L
AST SERPL W P-5'-P-CCNC: 26 U/L (ref 13–39)
BASOPHILS # BLD AUTO: 0.02 THOUSANDS/ÂΜL (ref 0–0.1)
BASOPHILS NFR BLD AUTO: 0 % (ref 0–1)
BILIRUB SERPL-MCNC: 0.36 MG/DL (ref 0.2–1)
BUN SERPL-MCNC: 12 MG/DL (ref 5–25)
CALCIUM SERPL-MCNC: 9.2 MG/DL (ref 8.4–10.2)
CHLORIDE SERPL-SCNC: 105 MMOL/L (ref 96–108)
CHOLEST SERPL-MCNC: 202 MG/DL
CO2 SERPL-SCNC: 28 MMOL/L (ref 21–32)
CREAT SERPL-MCNC: 0.7 MG/DL (ref 0.6–1.3)
EOSINOPHIL # BLD AUTO: 0.12 THOUSAND/ÂΜL (ref 0–0.61)
EOSINOPHIL NFR BLD AUTO: 2 % (ref 0–6)
ERYTHROCYTE [DISTWIDTH] IN BLOOD BY AUTOMATED COUNT: 12.1 % (ref 11.6–15.1)
EST. AVERAGE GLUCOSE BLD GHB EST-MCNC: 114 MG/DL
FOLATE SERPL-MCNC: 14.3 NG/ML
GFR SERPL CREATININE-BSD FRML MDRD: 114 ML/MIN/1.73SQ M
GLUCOSE P FAST SERPL-MCNC: 90 MG/DL (ref 65–99)
HBA1C MFR BLD: 5.6 %
HCT VFR BLD AUTO: 38.1 % (ref 34.8–46.1)
HDLC SERPL-MCNC: 48 MG/DL
HGB BLD-MCNC: 12.5 G/DL (ref 11.5–15.4)
IMM GRANULOCYTES # BLD AUTO: 0.02 THOUSAND/UL (ref 0–0.2)
IMM GRANULOCYTES NFR BLD AUTO: 0 % (ref 0–2)
LDLC SERPL CALC-MCNC: 137 MG/DL (ref 0–100)
LYMPHOCYTES # BLD AUTO: 2.45 THOUSANDS/ÂΜL (ref 0.6–4.47)
LYMPHOCYTES NFR BLD AUTO: 41 % (ref 14–44)
MCH RBC QN AUTO: 29.3 PG (ref 26.8–34.3)
MCHC RBC AUTO-ENTMCNC: 32.8 G/DL (ref 31.4–37.4)
MCV RBC AUTO: 89 FL (ref 82–98)
MONOCYTES # BLD AUTO: 0.51 THOUSAND/ÂΜL (ref 0.17–1.22)
MONOCYTES NFR BLD AUTO: 9 % (ref 4–12)
NEUTROPHILS # BLD AUTO: 2.89 THOUSANDS/ÂΜL (ref 1.85–7.62)
NEUTS SEG NFR BLD AUTO: 48 % (ref 43–75)
NONHDLC SERPL-MCNC: 154 MG/DL
NRBC BLD AUTO-RTO: 0 /100 WBCS
PLATELET # BLD AUTO: 258 THOUSANDS/UL (ref 149–390)
PMV BLD AUTO: 10.2 FL (ref 8.9–12.7)
POTASSIUM SERPL-SCNC: 4 MMOL/L (ref 3.5–5.3)
PROT SERPL-MCNC: 6.7 G/DL (ref 6.4–8.4)
RBC # BLD AUTO: 4.27 MILLION/UL (ref 3.81–5.12)
SODIUM SERPL-SCNC: 141 MMOL/L (ref 135–147)
TRIGL SERPL-MCNC: 83 MG/DL
TSH SERPL DL<=0.05 MIU/L-ACNC: 1.07 UIU/ML (ref 0.45–4.5)
VIT B12 SERPL-MCNC: 831 PG/ML (ref 180–914)
WBC # BLD AUTO: 6.01 THOUSAND/UL (ref 4.31–10.16)

## 2023-09-06 PROCEDURE — 36415 COLL VENOUS BLD VENIPUNCTURE: CPT

## 2023-09-06 PROCEDURE — 85025 COMPLETE CBC W/AUTO DIFF WBC: CPT

## 2023-09-06 PROCEDURE — 82607 VITAMIN B-12: CPT

## 2023-09-06 PROCEDURE — 80061 LIPID PANEL: CPT

## 2023-09-06 PROCEDURE — 84443 ASSAY THYROID STIM HORMONE: CPT

## 2023-09-06 PROCEDURE — 82306 VITAMIN D 25 HYDROXY: CPT

## 2023-09-06 PROCEDURE — 80053 COMPREHEN METABOLIC PANEL: CPT

## 2023-09-06 PROCEDURE — 83036 HEMOGLOBIN GLYCOSYLATED A1C: CPT

## 2023-09-06 PROCEDURE — 82746 ASSAY OF FOLIC ACID SERUM: CPT

## 2023-10-13 ENCOUNTER — OFFICE VISIT (OUTPATIENT)
Dept: URGENT CARE | Facility: CLINIC | Age: 33
End: 2023-10-13
Payer: COMMERCIAL

## 2023-10-13 VITALS
RESPIRATION RATE: 16 BRPM | SYSTOLIC BLOOD PRESSURE: 127 MMHG | HEIGHT: 62 IN | DIASTOLIC BLOOD PRESSURE: 77 MMHG | BODY MASS INDEX: 23.78 KG/M2 | OXYGEN SATURATION: 99 % | TEMPERATURE: 100.4 F | HEART RATE: 68 BPM

## 2023-10-13 DIAGNOSIS — J34.89 NASAL PAIN: Primary | ICD-10-CM

## 2023-10-13 PROCEDURE — G0382 LEV 3 HOSP TYPE B ED VISIT: HCPCS | Performed by: NURSE PRACTITIONER

## 2023-10-13 RX ORDER — KETOROLAC TROMETHAMINE 30 MG/ML
30 INJECTION, SOLUTION INTRAMUSCULAR; INTRAVENOUS ONCE
Status: COMPLETED | OUTPATIENT
Start: 2023-10-13 | End: 2023-10-13

## 2023-10-13 RX ADMIN — KETOROLAC TROMETHAMINE 30 MG: 30 INJECTION, SOLUTION INTRAMUSCULAR; INTRAVENOUS at 14:05

## 2023-10-13 NOTE — PROGRESS NOTES
[facility]        NAME: Marge Garcia is a 34 y.o. female  : 1990    MRN: [id number]  DATE: 2023  TIME: 2:38 PM    Assessment and Plan   Nasal pain [J34.89]  1. Nasal pain  ketorolac (TORADOL) injection 30 mg        Recent surgery completed on Tuesday on nose. Not wanting to take narcotics has been using Tylenol requesting Toradol injection. Toradol 30 mg provided in office follow-up with surgeon as needed    Patient Instructions       Follow up with PCP in 3-5 days. Proceed to  ER if symptoms worsen. Chief Complaint     Chief Complaint   Patient presents with   • Facial Pain     Pt reports facial pain primarily nose with onset yesterday. Denies any injury. Hx of nasal surgery Tuesday. Managing with Tylenol. History of Present Illness       Patient is a 43-year-old female arrives with complaints of nasal pain. Patient had nasal surgery completed on Tuesday of this week she does not want to take narcotics has been giving Tylenol which she has been taking and Toradol which she did not take today. She did have a Toradol injection in the office Wednesday and was wondering if we could give her an injection if she would not take the Toradol pills at home today        Review of Systems   Review of Systems   Constitutional:  Negative for activity change, appetite change, chills, fatigue and fever. HENT:  Positive for facial swelling. Negative for congestion, postnasal drip, rhinorrhea, sneezing and sore throat. Respiratory:  Negative for cough, chest tightness, shortness of breath and wheezing. Cardiovascular:  Negative for chest pain and palpitations. Gastrointestinal:  Negative for abdominal pain, constipation, diarrhea, nausea and vomiting. Musculoskeletal:  Negative for arthralgias and myalgias. Skin:  Positive for color change. Negative for pallor and rash. Neurological:  Negative for dizziness, weakness, light-headedness and headaches.    Hematological: Negative for adenopathy. Psychiatric/Behavioral:  Negative for agitation and confusion. Current Medications       Current Outpatient Medications:   •  busPIRone (BUSPAR) 10 mg tablet, Take 10 mg by mouth 3 (three) times a day, Disp: , Rfl:   •  escitalopram (LEXAPRO) 5 mg tablet, Take 5 mg by mouth daily, Disp: , Rfl:   •  traZODone (DESYREL) 50 mg tablet, Take 50 mg by mouth daily, Disp: , Rfl:   •  tretinoin (RETIN-A) 0.025 % cream, Apply pea sized amount to entire face nightly, Disp: 45 g, Rfl: 3  No current facility-administered medications for this visit. Current Allergies     Allergies as of 10/13/2023   • (No Known Allergies)            The following portions of the patient's history were reviewed and updated as appropriate: allergies, current medications, past family history, past medical history, past social history, past surgical history and problem list.     Past Medical History:   Diagnosis Date   • Acne    • Anxiety    • Depression        No past surgical history on file. Family History   Problem Relation Age of Onset   • Heart attack Mother    • Hypertension Mother    • Acne Father    • No Known Problems Sister    • No Known Problems Brother    • Hypertension Maternal Grandmother    • Diabetes Maternal Grandmother    • Hyperlipidemia Maternal Grandfather    • Hypertension Maternal Grandfather    • Diabetes Maternal Grandfather    • Heart attack Paternal Grandmother    • Heart disease Paternal Grandmother    • Breast cancer Paternal Grandmother    • Stroke Paternal Grandmother    • Asthma Paternal Grandfather          Medications have been verified. Objective   /77 (BP Location: Left arm, Patient Position: Sitting)   Pulse 68   Temp 100.4 °F (38 °C)   Resp 16   Ht 5' 2" (1.575 m)   SpO2 99%   BMI 23.78 kg/m²   No LMP recorded. (Menstrual status: Birth Control). Physical Exam     Physical Exam  Vitals and nursing note reviewed.    Constitutional:       General: She is not in acute distress. Appearance: Normal appearance. She is not ill-appearing or diaphoretic. HENT:      Head: Normocephalic and atraumatic. Comments: Noted bruising surrounding bilateral eyes and nasal area with swelling of the face and bandage over the nose area     Nose: No congestion or rhinorrhea. Eyes:      General: No scleral icterus. Right eye: No discharge. Left eye: No discharge. Pulmonary:      Effort: Pulmonary effort is normal. No respiratory distress. Musculoskeletal:         General: Normal range of motion. Cervical back: Normal range of motion. Skin:     Coloration: Skin is not jaundiced or pale. Findings: No bruising, erythema or rash. Neurological:      General: No focal deficit present. Mental Status: She is alert and oriented to person, place, and time. Psychiatric:         Mood and Affect: Mood normal.         Behavior: Behavior normal.         Thought Content:  Thought content normal.         Judgment: Judgment normal.

## 2023-12-10 DIAGNOSIS — Z00.6 ENCOUNTER FOR EXAMINATION FOR NORMAL COMPARISON OR CONTROL IN CLINICAL RESEARCH PROGRAM: ICD-10-CM

## 2024-01-31 ENCOUNTER — APPOINTMENT (OUTPATIENT)
Dept: LAB | Facility: HOSPITAL | Age: 34
End: 2024-01-31

## 2024-01-31 DIAGNOSIS — Z00.6 ENCOUNTER FOR EXAMINATION FOR NORMAL COMPARISON OR CONTROL IN CLINICAL RESEARCH PROGRAM: ICD-10-CM

## 2024-01-31 PROCEDURE — 36415 COLL VENOUS BLD VENIPUNCTURE: CPT

## 2024-02-25 LAB
APOB+LDLR+PCSK9 GENE MUT ANL BLD/T: NOT DETECTED
BRCA1+BRCA2 DEL+DUP + FULL MUT ANL BLD/T: NOT DETECTED
MLH1+MSH2+MSH6+PMS2 GN DEL+DUP+FUL M: NOT DETECTED

## 2024-03-22 ENCOUNTER — TELEPHONE (OUTPATIENT)
Dept: PSYCHIATRY | Facility: CLINIC | Age: 34
End: 2024-03-22

## 2024-03-22 NOTE — TELEPHONE ENCOUNTER
Patient called and LVM requesting psychiatry. Patient thought she was on the wait list from almost a year ago. Writer called back and explained that the  called in May 2023 to schedule but the voicemail was full. Writer advised that  will call her back to schedule an appt.

## 2024-03-25 ENCOUNTER — TELEPHONE (OUTPATIENT)
Dept: PSYCHIATRY | Facility: CLINIC | Age: 34
End: 2024-03-25

## 2024-04-02 ENCOUNTER — APPOINTMENT (OUTPATIENT)
Dept: LAB | Facility: HOSPITAL | Age: 34
End: 2024-04-02

## 2024-04-02 DIAGNOSIS — Z00.8 ENCOUNTER FOR OTHER GENERAL EXAMINATION: ICD-10-CM

## 2024-04-02 LAB
CHOLEST SERPL-MCNC: 189 MG/DL
EST. AVERAGE GLUCOSE BLD GHB EST-MCNC: 105 MG/DL
HBA1C MFR BLD: 5.3 %
HDLC SERPL-MCNC: 55 MG/DL
LDLC SERPL CALC-MCNC: 117 MG/DL (ref 0–100)
NONHDLC SERPL-MCNC: 134 MG/DL
TRIGL SERPL-MCNC: 86 MG/DL

## 2024-04-02 PROCEDURE — 80061 LIPID PANEL: CPT

## 2024-04-02 PROCEDURE — 36415 COLL VENOUS BLD VENIPUNCTURE: CPT

## 2024-04-02 PROCEDURE — 83036 HEMOGLOBIN GLYCOSYLATED A1C: CPT

## 2024-04-09 ENCOUNTER — TELEPHONE (OUTPATIENT)
Dept: PSYCHIATRY | Facility: CLINIC | Age: 34
End: 2024-04-09

## 2024-04-09 NOTE — TELEPHONE ENCOUNTER
"Behavioral Health Outpatient Intake Questions    Referred By   : SELF    Please advise interviewee that they need to answer all questions truthfully to allow for best care, and any misrepresentations of information may affect their ability to be seen at this clinic   => Was this discussed? Yes     If Minor Child (under age 18)    Who is/are the legal guardian(s) of the child?     Is there a custody agreement? No     If \"YES\"- Custody orders must be obtained prior to scheduling the first appointment  In addition, Consent to Treatment must be signed by all legal guardians prior to scheduling the first appointment    If \"NO\"- Consent to Treatment must be signed by all legal guardians prior to scheduling the first appointment    Behavioral Health Outpatient Intake History -     Presenting Problem (in patient's own words):  Med mgmt services, for anxiety and depression    Are there any communication barriers for this patient?     No                                               If yes, please describe barriers:   If there is a unique situation, please refer to Abhinav Garrett/Cydney Dominguez for final determination.    Are you taking any psychiatric medications? Yes     If \"YES\" -What are they Lexapro, Buspar Trazadone    If \"YES\" -Who prescribes? PCP    Has the Patient previously received outpatient Talk Therapy or Medication Management from [facility]  Yes        If \"YES\"- When, Where and with Whom? [facility] many years ago        If \"NO\" -Has Patient received these services elsewhere?       If \"YES\" -When, Where, and with Whom?    Has the Patient abused alcohol or other substances in the last 6 months ? No       If \"YES\" -What substance, How much, How often?   If illegal substance: Refer to [facility] (for [facility]) or [facility].   If Alcohol in excess of 10 drinks per week:  Refer to [facility] (for [facility]) or [facility]    Legal History-     Is this treatment court ordered? No   If \"yes \"send to " ":  Talk Therapy : Send to Abhinav Dominguez for final determination   Med Management: Send to Dr Willett for final determination     Has the Patient been convicted of a felony?  No   If \"Yes\" send to -When, What?  Talk Therapy : Send to Abhinav Dominguez for final determination   Med Management: Send to Dr Willett for final determination     ACCEPTED as a patient Yes  If \"Yes\" Appointment Date: 4/19/24    Referred Elsewhere? No  If “Yes” - (Where? Ex: [facility], [facility], [facility], [facility], etc.)     Name of Insurance Co: [employer]  Insurance ID#  [id number]   Insurance Phone #  If ins is primary or secondary?  If patient is a minor, parents information such as Name, D.O.B of guarantor.  "

## 2024-05-31 ENCOUNTER — TELEMEDICINE (OUTPATIENT)
Dept: PSYCHIATRY | Facility: CLINIC | Age: 34
End: 2024-05-31
Payer: COMMERCIAL

## 2024-05-31 DIAGNOSIS — F41.1 GAD (GENERALIZED ANXIETY DISORDER): Primary | ICD-10-CM

## 2024-05-31 DIAGNOSIS — F95.9 TIC DISORDER, UNSPECIFIED: ICD-10-CM

## 2024-05-31 DIAGNOSIS — G47.00 INSOMNIA, UNSPECIFIED TYPE: ICD-10-CM

## 2024-05-31 PROCEDURE — 90792 PSYCH DIAG EVAL W/MED SRVCS: CPT | Performed by: STUDENT IN AN ORGANIZED HEALTH CARE EDUCATION/TRAINING PROGRAM

## 2024-05-31 RX ORDER — ESCITALOPRAM OXALATE 5 MG/1
5 TABLET ORAL DAILY
Qty: 90 TABLET | Refills: 0 | Status: SHIPPED | OUTPATIENT
Start: 2024-05-31 | End: 2024-08-29

## 2024-05-31 RX ORDER — GUANFACINE 1 MG/1
1 TABLET ORAL
Qty: 30 TABLET | Refills: 0 | Status: SHIPPED | OUTPATIENT
Start: 2024-05-31 | End: 2024-06-30

## 2024-05-31 RX ORDER — TRAZODONE HYDROCHLORIDE 50 MG/1
50 TABLET ORAL DAILY
Qty: 30 TABLET | Refills: 2 | Status: SHIPPED | OUTPATIENT
Start: 2024-05-31 | End: 2024-08-29

## 2024-05-31 RX ORDER — BUSPIRONE HYDROCHLORIDE 10 MG/1
10 TABLET ORAL 3 TIMES DAILY
Qty: 90 TABLET | Refills: 0 | Status: SHIPPED | OUTPATIENT
Start: 2024-05-31 | End: 2024-06-30
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
## 2024-06-14 ENCOUNTER — TELEMEDICINE (OUTPATIENT)
Dept: PSYCHIATRY | Facility: CLINIC | Age: 34
End: 2024-06-14
Payer: COMMERCIAL

## 2024-06-14 DIAGNOSIS — G47.00 INSOMNIA, UNSPECIFIED TYPE: ICD-10-CM

## 2024-06-14 DIAGNOSIS — F41.1 GAD (GENERALIZED ANXIETY DISORDER): ICD-10-CM

## 2024-06-14 PROCEDURE — 99214 OFFICE O/P EST MOD 30 MIN: CPT | Performed by: STUDENT IN AN ORGANIZED HEALTH CARE EDUCATION/TRAINING PROGRAM

## 2024-06-17 RX ORDER — BUSPIRONE HYDROCHLORIDE 30 MG/1
30 TABLET ORAL 2 TIMES DAILY
Qty: 60 TABLET | Refills: 2 | Status: SHIPPED | OUTPATIENT
Start: 2024-06-17 | End: 2024-09-15

## 2024-06-17 RX ORDER — TRAZODONE HYDROCHLORIDE 50 MG/1
50 TABLET ORAL DAILY
Qty: 30 TABLET | Refills: 2 | Status: SHIPPED | OUTPATIENT
Start: 2024-06-17 | End: 2024-09-15

## 2024-06-17 RX ORDER — ESCITALOPRAM OXALATE 5 MG/1
5 TABLET ORAL DAILY
Qty: 90 TABLET | Refills: 0 | Status: SHIPPED | OUTPATIENT
Start: 2024-06-17 | End: 2024-09-15

## 2024-06-18 NOTE — PSYCH
MEDICATION MANAGEMENT NOTE        [facility]      Name and Date of Birth:  Debbie Yeung 33 y.o. 1990 MRN: [id number]    Date of Visit: June 17, 2024    Reason for Visit: Follow-up visit for medication management     Virtual Visit Disclaimer:       TeleMed provider: Brissa Jay D.O.    Location: [place]     Verification of patient location:     Patient is currently located in the [facility]  Patient is currently located in a state in which I am licensed     After connecting through [employer], the patient was identified by name and date of birth.  Debbie Yeung was informed that this is a telemedicine visit that is being conducted through [employer], and the patient was informed that this is a secure, HIPAA-compliant platform. My office door was closed. No one else was in the room. Debbie Yeung acknowledged consent and understanding of privacy and security of the video platform. Debbie understands that the online visit is based solely on information provided by the patient, and that, in the absence of a face-to-face physical evaluation by the physician, the diagnosis Debbie  receives is both limited and provisional in terms of accuracy and completeness. Debbie Yeung understands that they can discontinue the visit at any time. I informed Debbie that I have reviewed their record in EPIC and presented the opportunity for them to ask any questions regarding the visit today. Debbie Yeung voiced understanding and consented to these terms. Debbie is aware this is a billable service. Debbie is present at her home residence and primary address      SUBJECTIVE:    Debbie Yeung is a 33 y.o. female with past psychiatric history significant for  who was personally seen and evaluated today at the [facility] outpatient clinic for follow-up and medication management. Debbie denies SI, HI, AVH, delusions, nikunj since her last appointment.   She continues to endorse some feelings of depression and anxiety secondary to her experience of chronic tics which she states mildly improved on guanfacine.  However, patient cited concerns about decreased heart rate on guanfacine and after discussion of risks, benefits, potential side effects, alternatives we will discontinue it.  Buspirone will be increased to 30 mg twice daily as patient states that her anxiety worsens her tics.  We will discuss further options at our next appointment such as potentially initiation of antipsychotics at low doses.  Patient denies acute mental complaints or concerns at this time.        Current Rating Scores:     None completed today.    Review Of Systems:      Constitutional negative   ENT negative   Cardiovascular negative   Respiratory negative   Gastrointestinal negative   Genitourinary negative   Musculoskeletal negative   Integumentary negative   Neurological Occasional tics   Endocrine negative   Other Symptoms none, all other systems are negative       Past Psychiatric History: (unchanged information from previous note copied and italicized) - Information that is bolded has been updated.     See intake    Substance Abuse History: (unchanged information from previous note copied and italicized) - Information that is bolded has been updated.     See intake    Social History: (unchanged information from previous note copied and italicized) - Information that is bolded has been updated.     See intake    Traumatic History: (unchanged information from previous note copied and italicized) - Information that is bolded has been updated.     See intake      Past Medical History:    Past Medical History:   Diagnosis Date    Acne     Anxiety     Depression         No past surgical history on file.  No Known Allergies    Substance Abuse History:    Social History     Substance and Sexual Activity   Alcohol Use Never     Social History     Substance and Sexual Activity   Drug Use Not  Currently       Social History:    Social History     Socioeconomic History    Marital status: Single     Spouse name: Not on file    Number of children: Not on file    Years of education: Not on file    Highest education level: Not on file   Occupational History    Not on file   Tobacco Use    Smoking status: Former     Current packs/day: 0.00     Average packs/day: 0.3 packs/day for 10.0 years (2.5 ttl pk-yrs)     Types: Cigarettes     Start date: 1/15/2010     Quit date: 1/15/2020     Years since quittin.4    Smokeless tobacco: Never   Vaping Use    Vaping status: Never Used   Substance and Sexual Activity    Alcohol use: Never    Drug use: Not Currently    Sexual activity: Yes     Partners: Male     Birth control/protection: I.U.D.   Other Topics Concern    Not on file   Social History Narrative    Not on file     Social Determinants of Health     Financial Resource Strain: Not on file   Food Insecurity: Not on file   Transportation Needs: Not on file   Physical Activity: Not on file   Stress: Not on file   Social Connections: Not on file   Intimate Partner Violence: Not on file   Housing Stability: Not on file       Family Psychiatric History:     Family History   Problem Relation Age of Onset    Heart attack Mother     Hypertension Mother     Acne Father     No Known Problems Sister     No Known Problems Brother     Hypertension Maternal Grandmother     Diabetes Maternal Grandmother     Hyperlipidemia Maternal Grandfather     Hypertension Maternal Grandfather     Diabetes Maternal Grandfather     Heart attack Paternal Grandmother     Heart disease Paternal Grandmother     Breast cancer Paternal Grandmother     Stroke Paternal Grandmother     Asthma Paternal Grandfather        History Review: The following portions of the patient's history were reviewed and updated as appropriate: allergies, current medications, past family history, past medical history, past social history, past surgical history, and  problem list.         OBJECTIVE:     Vital signs in last 24 hours:    There were no vitals filed for this visit.    Mental Status Evaluation:    Appearance age appropriate, casually dressed   Behavior cooperative, mildly anxious   Speech normal rate, normal volume, normal pitch   Mood dysphoric   Affect constricted   Thought Processes organized, goal directed   Associations intact associations   Thought Content no overt delusions   Perceptual Disturbances: no auditory hallucinations, no visual hallucinations   Abnormal Thoughts  Risk Potential Suicidal ideation - None  Homicidal ideation - None  Potential for aggression - No   Orientation oriented to person, place, time/date, and situation   Memory recent and remote memory grossly intact   Consciousness alert and awake   Attention Span Concentration Span attention span and concentration are age appropriate   Intellect appears to be of average intelligence   Insight intact   Judgement intact   Muscle Strength and  Gait unable to assess today due to virtual visit   Motor activity unable to assess today due to virtual visit   Language no difficulty naming common objects, no difficulty repeating a phrase   Fund of Knowledge adequate knowledge of current events  adequate fund of knowledge regarding past history   Pain none   Pain Scale Did not ask patient to formally rate       Laboratory Results: I have personally reviewed all pertinent laboratory/tests results    Recent Labs (last 2 months):   No visits with results within 2 Month(s) from this visit.   Latest known visit with results is:   Appointment on 04/02/2024   Component Date Value    Hemoglobin A1C 04/02/2024 5.3     EAG 04/02/2024 105     Cholesterol 04/02/2024 189     Triglycerides 04/02/2024 86     HDL, Direct 04/02/2024 55     LDL Calculated 04/02/2024 117 (H)     Non-HDL-Chol (CHOL-HDL) 04/02/2024 134        Suicide/Homicide Risk Assessment:    Risk of Harm to Self:  The following ratings are based on  assessment at the time of the interview  Historical Risk Factors include: chronic psychiatric problems  Protective Factors: no current suicidal ideation, access to mental health treatment, compliant with medications, compliant with mental health treatment, having a desire to be alive, responsibilities and duties to others, stable living environment, stable job, strong relationships    Risk of Harm to Others:  The following ratings are based on assessment at the time of the interview  Historical Risk Factors include: none.  Protective Factors: no current homicidal ideation    The following interventions are recommended: contracts for safety at present - agrees to go to ED if feeling unsafe, contracts for safety at present - agrees to call Crisis Intervention Service if feeling unsafe      Lethality Statement:    Based on today's assessment and clinical criteria, Debbie Yeung contracts for safety and is not an imminent risk of harm to self or others. Outpatient level of care is deemed appropriate at this current time. Debbie understands that if they can no longer contract for safety, they need to call the office or report to their nearest Emergency Room for immediate evaluation. They voiced understanding and agreement to call 911 or head to the nearest ED should they have any physical or mental decompensation whatsoever.       Assessment/Plan:     1.)  PAMELA  2.)  Provisional Tic disorder  3.)          After discussion of risks, benefits, potential side effects, alternatives, we will increase buspirone to 30 mg twice daily, discontinue guanfacine, continue escitalopram 5 mg daily and trazodone 50 mg nightly as needed for insomnia.  Future directions will include discussing low-dose antipsychotics for tics or potential referral to neurology.  Patient denies acute mental complaints or concerns at this time          Medications Risks/Benefits      Risks, Benefits And Possible Side Effects Of Medications:    Risks,  benefits, and possible side effects of medications explained to Debbie and she verbalizes understanding and agreement for treatment.  Risks of medications in pregnancy explained to Debbie. She verbalizes understanding and agrees to notify her doctor if she becomes pregnant.    Controlled Medication Discussion:     Not applicable    Psychotherapy Provided:     Individual psychotherapy provided: Crisis/safety plan discussed with Debbie.     Treatment Plan:    Completed and signed during the session:  We will complete at next appointment due to lack of time today      Visit Time    Visit Start Time: 11:00 AM  Visit Stop Time: 11:15 AM  Total Visit Duration:  15 minutes     The total visit duration detailed above includes: patient engagement, medication management, psychotherapy/counseling, discussion regarding treatment goals, documentation, review of past medical records, and coordination of care.      Note Share Disclaimer:     This note was not shared with the patient due to reasonable likelihood of causing patient harm      Brissa Jay DO  Psychiatry  06/17/24

## 2024-06-19 ENCOUNTER — APPOINTMENT (OUTPATIENT)
Dept: LAB | Facility: HOSPITAL | Age: 34
End: 2024-06-19
Payer: COMMERCIAL

## 2024-06-19 DIAGNOSIS — M25.50 PAIN IN JOINT, MULTIPLE SITES: ICD-10-CM

## 2024-06-19 LAB — B BURGDOR IGG+IGM SER QL IA: NEGATIVE

## 2024-06-19 PROCEDURE — 86618 LYME DISEASE ANTIBODY: CPT

## 2024-06-19 PROCEDURE — 36415 COLL VENOUS BLD VENIPUNCTURE: CPT

## 2024-07-19 ENCOUNTER — TELEMEDICINE (OUTPATIENT)
Dept: PSYCHIATRY | Facility: CLINIC | Age: 34
End: 2024-07-19
Payer: COMMERCIAL

## 2024-07-19 DIAGNOSIS — F41.1 GAD (GENERALIZED ANXIETY DISORDER): ICD-10-CM

## 2024-07-19 DIAGNOSIS — F95.9 TIC DISORDER, UNSPECIFIED: Primary | ICD-10-CM

## 2024-07-19 PROCEDURE — 99214 OFFICE O/P EST MOD 30 MIN: CPT | Performed by: STUDENT IN AN ORGANIZED HEALTH CARE EDUCATION/TRAINING PROGRAM

## 2024-07-19 RX ORDER — ARIPIPRAZOLE 2 MG/1
2 TABLET ORAL DAILY
Qty: 30 TABLET | Refills: 0 | Status: SHIPPED | OUTPATIENT
Start: 2024-07-19 | End: 2024-08-18

## 2024-07-19 RX ORDER — ARIPIPRAZOLE 2 MG/1
2 TABLET ORAL DAILY
Qty: 30 TABLET | Refills: 0 | Status: SHIPPED | OUTPATIENT
Start: 2024-07-19 | End: 2024-07-19

## 2024-07-19 NOTE — PSYCH
MEDICATION MANAGEMENT NOTE        [facility]      Name and Date of Birth:  Debbie Yeung 33 y.o. 1990 MRN: [id number]    Date of Visit: July 19, 2024    Reason for Visit: Follow-up visit for medication management     Virtual Visit Disclaimer:       TeleMed provider: Brissa Jay D.O.    Location: [place]     Verification of patient location:     Patient is currently located in the [facility]  Patient is currently located in a state in which I am licensed     After connecting through [employer], the patient was identified by name and date of birth.  Debbie Yeung was informed that this is a telemedicine visit that is being conducted through [employer], and the patient was informed that this is a secure, HIPAA-compliant platform. My office door was closed. No one else was in the room. Debbie Yeung acknowledged consent and understanding of privacy and security of the video platform. Debbie understands that the online visit is based solely on information provided by the patient, and that, in the absence of a face-to-face physical evaluation by the physician, the diagnosis Debbie  receives is both limited and provisional in terms of accuracy and completeness. Debbie Yeung understands that they can discontinue the visit at any time. I informed Debbie that I have reviewed their record in EPIC and presented the opportunity for them to ask any questions regarding the visit today. Debbie Yeung voiced understanding and consented to these terms. Debbie is aware this is a billable service. Debbie is present at her home residence and primary address      SUBJECTIVE:    Debbie Yeung is a 33 y.o. female with past psychiatric history significant for  who was personally seen and evaluated today at the [facility] outpatient clinic for follow-up and medication management. Debbie denies SI, HI, AVH, delusions, nikunj since her last appointment.   She endorses improved mood and decreased anxiety since our last appointment after increase of buspirone.  After discussion of risks, benefits, potential side effects, alternatives, we will initiate low-dose Abilify to better control tics due to Abilify's decreased propensity for metabolic effects compared with other antipsychotics such as risperidone.  Patient previously failed trial of guanfacine.  She denies acute mental complaints or concerns at this time.        Current Rating Scores:     None completed today.    Review Of Systems:      Constitutional negative   ENT negative   Cardiovascular negative   Respiratory negative   Gastrointestinal negative   Genitourinary negative   Musculoskeletal negative   Integumentary negative   Neurological Occasional tics   Endocrine negative   Other Symptoms none, all other systems are negative       Past Psychiatric History: (unchanged information from previous note copied and italicized) - Information that is bolded has been updated.     See intake    Substance Abuse History: (unchanged information from previous note copied and italicized) - Information that is bolded has been updated.     See intake    Social History: (unchanged information from previous note copied and italicized) - Information that is bolded has been updated.     See intake    Traumatic History: (unchanged information from previous note copied and italicized) - Information that is bolded has been updated.     See intake      Past Medical History:    Past Medical History:   Diagnosis Date    Acne     Anxiety     Depression         No past surgical history on file.  No Known Allergies    Substance Abuse History:    Social History     Substance and Sexual Activity   Alcohol Use Never     Social History     Substance and Sexual Activity   Drug Use Not Currently       Social History:    Social History     Socioeconomic History    Marital status: Single     Spouse name: Not on file    Number of children: Not  on file    Years of education: Not on file    Highest education level: Not on file   Occupational History    Not on file   Tobacco Use    Smoking status: Former     Current packs/day: 0.00     Average packs/day: 0.3 packs/day for 10.0 years (2.5 ttl pk-yrs)     Types: Cigarettes     Start date: 1/15/2010     Quit date: 1/15/2020     Years since quittin.5    Smokeless tobacco: Never   Vaping Use    Vaping status: Never Used   Substance and Sexual Activity    Alcohol use: Never    Drug use: Not Currently    Sexual activity: Yes     Partners: Male     Birth control/protection: I.U.D.   Other Topics Concern    Not on file   Social History Narrative    Not on file     Social Determinants of Health     Financial Resource Strain: Not on file   Food Insecurity: Not on file   Transportation Needs: Not on file   Physical Activity: Not on file   Stress: Not on file   Social Connections: Not on file   Intimate Partner Violence: Not on file   Housing Stability: Not on file       Family Psychiatric History:     Family History   Problem Relation Age of Onset    Heart attack Mother     Hypertension Mother     Acne Father     No Known Problems Sister     No Known Problems Brother     Hypertension Maternal Grandmother     Diabetes Maternal Grandmother     Hyperlipidemia Maternal Grandfather     Hypertension Maternal Grandfather     Diabetes Maternal Grandfather     Heart attack Paternal Grandmother     Heart disease Paternal Grandmother     Breast cancer Paternal Grandmother     Stroke Paternal Grandmother     Asthma Paternal Grandfather        History Review: The following portions of the patient's history were reviewed and updated as appropriate: allergies, current medications, past family history, past medical history, past social history, past surgical history, and problem list.         OBJECTIVE:     Vital signs in last 24 hours:    There were no vitals filed for this visit.    Mental Status Evaluation:    Appearance age  "appropriate, casually dressed   Behavior cooperative, mildly anxious   Speech normal rate, normal volume, normal pitch   Mood \"Better\"   Affect constricted   Thought Processes organized, goal directed   Associations intact associations   Thought Content no overt delusions   Perceptual Disturbances: no auditory hallucinations, no visual hallucinations   Abnormal Thoughts  Risk Potential Suicidal ideation - None  Homicidal ideation - None  Potential for aggression - No   Orientation oriented to person, place, time/date, and situation   Memory recent and remote memory grossly intact   Consciousness alert and awake   Attention Span Concentration Span attention span and concentration are age appropriate   Intellect appears to be of average intelligence   Insight intact   Judgement intact   Muscle Strength and  Gait unable to assess today due to virtual visit   Motor activity unable to assess today due to virtual visit   Language no difficulty naming common objects, no difficulty repeating a phrase   Fund of Knowledge adequate knowledge of current events  adequate fund of knowledge regarding past history   Pain none   Pain Scale Did not ask patient to formally rate       Laboratory Results: I have personally reviewed all pertinent laboratory/tests results    Recent Labs (last 2 months):   Appointment on 06/19/2024   Component Date Value    Lyme Total Antibodies 06/19/2024 Negative        Suicide/Homicide Risk Assessment:    Risk of Harm to Self:  The following ratings are based on assessment at the time of the interview  Historical Risk Factors include: chronic psychiatric problems  Protective Factors: no current suicidal ideation, access to mental health treatment, compliant with medications, compliant with mental health treatment, having a desire to be alive, responsibilities and duties to others, stable living environment, stable job, strong relationships    Risk of Harm to Others:  The following ratings are based on " assessment at the time of the interview  Historical Risk Factors include: none.  Protective Factors: no current homicidal ideation    The following interventions are recommended: contracts for safety at present - agrees to go to ED if feeling unsafe, contracts for safety at present - agrees to call Crisis Intervention Service if feeling unsafe      Lethality Statement:    Based on today's assessment and clinical criteria, Debbie Yeung contracts for safety and is not an imminent risk of harm to self or others. Outpatient level of care is deemed appropriate at this current time. Debbie understands that if they can no longer contract for safety, they need to call the office or report to their nearest Emergency Room for immediate evaluation. They voiced understanding and agreement to call 911 or head to the nearest ED should they have any physical or mental decompensation whatsoever.       Assessment/Plan:     1.)  PAMELA  2.)  Provisional Tic disorder  3.)          After discussion of risks, benefits, potential side effects, alternatives, we will initiate Abilify 2 mg daily with the intent to titrate upwards as tolerated and needed for better control of tics.  Rest of regimen remains the same          Medications Risks/Benefits      Risks, Benefits And Possible Side Effects Of Medications:    Risks, benefits, and possible side effects of medications explained to Debbie and she verbalizes understanding and agreement for treatment.  Risks of medications in pregnancy explained to Debbie. She verbalizes understanding and agrees to notify her doctor if she becomes pregnant.    Controlled Medication Discussion:     Not applicable    Psychotherapy Provided:     Individual psychotherapy provided: Crisis/safety plan discussed with Debbie.     Treatment Plan:    Completed and signed during the session:  We will complete at next appointment due to lack of time today      Visit Time    Visit Start Time: 8:00 AM  Visit Stop Time:  8:15 AM  Total Visit Duration:  15 minutes     The total visit duration detailed above includes: patient engagement, medication management, psychotherapy/counseling, discussion regarding treatment goals, documentation, review of past medical records, and coordination of care.      Note Share Disclaimer:     This note was not shared with the patient due to reasonable likelihood of causing patient harm      Brissa Jay DO  Psychiatry  07/19/24

## 2024-07-25 ENCOUNTER — TELEMEDICINE (OUTPATIENT)
Dept: DERMATOLOGY | Facility: CLINIC | Age: 34
End: 2024-07-25
Payer: COMMERCIAL

## 2024-07-25 DIAGNOSIS — L70.0 ACNE VULGARIS: ICD-10-CM

## 2024-07-25 PROCEDURE — 99214 OFFICE O/P EST MOD 30 MIN: CPT | Performed by: NURSE PRACTITIONER

## 2024-07-25 RX ORDER — DOXYCYCLINE 100 MG/1
100 CAPSULE ORAL 2 TIMES DAILY
Qty: 60 CAPSULE | Refills: 1 | Status: SHIPPED | OUTPATIENT
Start: 2024-07-25 | End: 2024-09-23

## 2024-07-25 NOTE — PATIENT INSTRUCTIONS
"Plan today:     AM:  - Start benzoyl peroxide cleanser  (over the counter products like Panoxyl, Cetaphil, CeraVe and Neutrogena contain this product listed under \"active ingredients\". Try to find Benzoyl Peroxide 4% or less)  - SPF 30 or greater (can be a lotion with SPF like CeraVe AM)  - Start non-comedogenic moisturizer such as CeraVe, Cetaphil or Vanicream.      PM:  - Gentle cleanser, such as CeraVe, Cetaphil or La Roche Posay  - Start Tretinoin 0.025% qHS. Educated that this medication can be drying and irritating. Start by applying a pea-sized amount of product 2 nights per week, then increase to nightly as tolerated. Written instructions provided.  - Start non-comedogenic moisturizer such as CeraVe, Cetaphil or Vanicream. May use on top of retinoid. If retinoid is too drying, may employ the \"sandwich method.\" To do this, apply layer of non-comedogenic moisturizer, followed by layer of retinoid, followed by another layer of non-comedogenic moisturizer.   - Start Doxycycline 100 mg BID. Take again, as needed for flares. Educated on side effects, including GI upset, sun sensitivity, esophagitis. Discussed taking pill at least 2 hours before bedtime, taking pills with food and water and avoiding sun.     Call with any questions or concerns before next follow-up visit; do not stop medications abruptly without consulting provider.    COMMON POSSIBLE SIDE EFFECTS OF MEDICATIONS    Retinoids - dryness, redness, increased sun sensitivity.  Benzoyl peroxide - drying, redness, bleaching of clothes, towels and sheets, allergy.  Doxycycline - headaches; dizziness; irritation of the throat; nail changes; discoloration of teeth.  Sun sensitivity - even if you have dark skin, this medicine can make you burn more easily.  Make sure you protect yourself from the sun, either by avoiding being outside between 11 AM and 3 PM, wearing and reapplying sunscreen/sunblock, or wearing sun protective clothing  Nausea/vomiting - if you " experience nausea with this medication, take it with food.    WHEN AND WHERE TO CALL WITH CONCERNS  We are here to help!  If you experience any unusual symptoms, then stop taking or using the medication and call our office at [phone] (SKIN).  It is better to be safe than to be sorry!

## 2024-07-25 NOTE — PROGRESS NOTES
"[facility] Note     Patient Name: Debbie Yeung  Encounter Date: 7/25/24     Have you been cared for by a [facility] Dermatologist in the last 3 years and, if so, which description applies to you?    Yes.  I have been here within the last 3 years, and my medical history has NOT changed since that time.  I am FEMALE/of child-bearing potential.    REVIEW OF SYSTEMS:  Have you recently had or currently have any of the following? No changes in my recent health.   PAST MEDICAL HISTORY:  Have you personally ever had or currently have any of the following?  If \"YES,\" then please provide more detail. No changes in my medical history.   HISTORY OF IMMUNOSUPPRESSION: Do you have a history of any of the following:  Systemic Immunosuppression such as Diabetes, Biologic or Immunotherapy, Chemotherapy, Organ Transplantation, Bone Marrow Transplantation?  No     Answering \"YES\" requires the addition of the dotphrase \"IMMUNOSUPPRESSED\" as the first diagnosis of the patient's visit.   FAMILY HISTORY:  Any \"first degree relatives\" (parent, brother, sister, or child) with the following?    No changes in my family's known health.   PATIENT EXPERIENCE:    Do you want the Dermatologist to perform a COMPLETE skin exam today including a clinical examination under the \"bra and underwear\" areas?  NO  If necessary, do we have your permission to call and leave a detailed message on your Preferred Phone number that includes your specific medical information?  Yes      No Known Allergies   Current Outpatient Medications:     ARIPiprazole (ABILIFY) 2 mg tablet, Take 1 tablet (2 mg total) by mouth daily, Disp: 30 tablet, Rfl: 0    busPIRone (BUSPAR) 30 MG tablet, Take 1 tablet (30 mg total) by mouth 2 (two) times a day, Disp: 60 tablet, Rfl: 2    escitalopram (LEXAPRO) 5 mg tablet, Take 1 tablet (5 mg total) by mouth daily, Disp: 90 tablet, Rfl: 0    traZODone (DESYREL) 50 mg tablet, Take 1 tablet (50 mg total) by mouth daily, " "Disp: 30 tablet, Rfl: 2    tretinoin (RETIN-A) 0.025 % cream, Apply pea sized amount to entire face nightly, Disp: 45 g, Rfl: 3          Whom besides the patient is providing clinical information about today's encounter?   NO ADDITIONAL HISTORIAN (patient alone provided history)    Physical Exam and Assessment/Plan by Diagnosis:    ACNE VULGARIS (\"COMMON ACNE\")    Physical Exam:  Anatomic Location Affected:  face  Morphological Description: few pink papules and open/closed comedones      Additional History of Present Condition:  hx of acne; controlled with current topical regimen; few inflammatory papules.     Discussed that treatment is directed at improving skin appearance and reducing the likelihood of scarring. Discussed theraputic ladder including topical OTC treatments, topical prescriptions, and oral medications. Discussed side effects as noted below.     Plan today:     AM:  - Start benzoyl peroxide cleanser  (over the counter products like Panoxyl, Cetaphil, CeraVe and Neutrogena contain this product listed under \"active ingredients\". Try to find Benzoyl Peroxide 4% or less)  - SPF 30 or greater (can be a lotion with SPF like CeraVe AM)  - Start non-comedogenic moisturizer such as CeraVe, Cetaphil or Vanicream.      PM:  - Gentle cleanser, such as CeraVe, Cetaphil or La Roche Posay  - Start Tretinoin 0.025% qHS. Educated that this medication can be drying and irritating. Start by applying a pea-sized amount of product 2 nights per week, then increase to nightly as tolerated. Written instructions provided.  - Start non-comedogenic moisturizer such as CeraVe, Cetaphil or Vanicream. May use on top of retinoid. If retinoid is too drying, may employ the \"sandwich method.\" To do this, apply layer of non-comedogenic moisturizer, followed by layer of retinoid, followed by another layer of non-comedogenic moisturizer.   - Start Doxycycline 100 mg BID. Take again, as needed for flares. Educated on side effects, " including GI upset, sun sensitivity, esophagitis. Discussed taking pill at least 2 hours before bedtime, taking pills with food and water and avoiding sun.     Call with any questions or concerns before next follow-up visit; do not stop medications abruptly without consulting provider.    COMMON POSSIBLE SIDE EFFECTS OF MEDICATIONS    Retinoids - dryness, redness, increased sun sensitivity.  Benzoyl peroxide - drying, redness, bleaching of clothes, towels and sheets, allergy.  Doxycycline - headaches; dizziness; irritation of the throat; nail changes; discoloration of teeth.  Sun sensitivity - even if you have dark skin, this medicine can make you burn more easily.  Make sure you protect yourself from the sun, either by avoiding being outside between 11 AM and 3 PM, wearing and reapplying sunscreen/sunblock, or wearing sun protective clothing  Nausea/vomiting - if you experience nausea with this medication, take it with food.    WHEN AND WHERE TO CALL WITH CONCERNS  We are here to help!  If you experience any unusual symptoms, then stop taking or using the medication and call our office at [phone] (SKIN).  It is better to be safe than to be sorry!    F/u in 3 months - Next visit may be virtual if continuing to respond well to therapy. Discussed eventual in-person visit since patient has only been seen virtually.     Scribe Attestation      I,:  CINDI Mireles am acting as a scribe while in the presence of the attending physician.:       I,:  CINDI Mireles personally performed the services described in this documentation    as scribed in my presence.:

## 2024-07-26 ENCOUNTER — TELEPHONE (OUTPATIENT)
Dept: DERMATOLOGY | Facility: CLINIC | Age: 34
End: 2024-07-26

## 2024-07-26 NOTE — TELEPHONE ENCOUNTER
Called patient to schedule for a 3 month follow up appointment (can be virtual) for acne. Patient stated she will call us back to schedule. Please schedule for a virtual with any AP or Dr. Ayala. Thank you.

## 2024-09-20 ENCOUNTER — DOCUMENTATION (OUTPATIENT)
Dept: BEHAVIORAL/MENTAL HEALTH CLINIC | Facility: CLINIC | Age: 34
End: 2024-09-20

## 2024-09-20 NOTE — PROGRESS NOTES
Psychotherapy Discharge Summary    Preferred Name: Debbie Yeung  YOB: 1990    Admission date to psychotherapy: 10/2022    Referred by: Self    Presenting Problem: Anxiety    Course of treatment included : individual therapy     Progress/Outcome of Treatment Goals (brief summary of course of treatment) Manage anxiety symptoms    Treatment Complications (if any): N/A    Treatment Progress: fair    Current [facility] Psychiatric Provider: N/A    Discharge Medications include: N/A    Discharge Date: 9/20/2024    Discharge Diagnosis: No diagnosis found.    Criteria for Discharge: demonstrated failure to uphold their treatment plan/contract    Aftercare recommendations include (include specific referral names and phone numbers, if appropriate): Return to [facility] if situation changes    Prognosis: fair

## 2024-10-04 ENCOUNTER — PROCEDURE VISIT (OUTPATIENT)
Age: 34
End: 2024-10-04
Payer: COMMERCIAL

## 2024-10-04 VITALS
DIASTOLIC BLOOD PRESSURE: 62 MMHG | BODY MASS INDEX: 25.76 KG/M2 | WEIGHT: 140 LBS | HEIGHT: 62 IN | SYSTOLIC BLOOD PRESSURE: 106 MMHG

## 2024-10-04 DIAGNOSIS — Z30.09 ENCOUNTER FOR COUNSELING REGARDING INITIATION OF OTHER CONTRACEPTIVE MEASURE: ICD-10-CM

## 2024-10-04 DIAGNOSIS — Z11.3 SCREENING FOR STD (SEXUALLY TRANSMITTED DISEASE): ICD-10-CM

## 2024-10-04 DIAGNOSIS — Z30.432 ENCOUNTER FOR IUD REMOVAL: Primary | ICD-10-CM

## 2024-10-04 PROCEDURE — 99213 OFFICE O/P EST LOW 20 MIN: CPT | Performed by: OBSTETRICS & GYNECOLOGY

## 2024-10-04 PROCEDURE — 87491 CHLMYD TRACH DNA AMP PROBE: CPT | Performed by: OBSTETRICS & GYNECOLOGY

## 2024-10-04 PROCEDURE — 87591 N.GONORRHOEAE DNA AMP PROB: CPT | Performed by: OBSTETRICS & GYNECOLOGY

## 2024-10-04 PROCEDURE — 58301 REMOVE INTRAUTERINE DEVICE: CPT | Performed by: OBSTETRICS & GYNECOLOGY

## 2024-10-04 RX ORDER — DIAZEPAM 10 MG
TABLET ORAL
Qty: 1 TABLET | Refills: 0 | Status: SHIPPED | OUTPATIENT
Start: 2024-10-04

## 2024-10-04 NOTE — PROGRESS NOTES
"Ambulatory Visit  Name: Debbie Yeung      : 1990      MRN: [id number]  Encounter Provider: Olga Marroquin MD  Encounter Date: 10/4/2024   Encounter department: [facility] [place]    Assessment & Plan  Encounter for IUD removal    Removed without difficulty.        Screening for STD (sexually transmitted disease)    Orders:    Chlamydia/GC amplified DNA by PCR    Hepatitis B surface antigen; Future    Hepatitis C antibody    RPR-Syphilis Screening (Total Syphilis IGG/IGM); Future    HIV 1/2 AG/AB w Reflex [facility] for 2 yr old and above; Future    Encounter for counseling regarding initiation of other contraceptive measure    Discussed options.  She would like paragard IUD.   Discussed cramps and bleeding.   Discussed option for Valium pre-procedure ONLY WITH .  Also discussed option for paracervical block - which she does desire.     Orders:    diazepam (VALIUM) 10 mg tablet; Take 1 tablet PO 1 hour prior to procedure.      History of Present Illness     Debbie Yeung is a 33 y.o. female who presents as a new patient to this practice to have her Mirena IUD removed.     She has a Mirena IUD in place since 2022.  Prior to this had a paragard.       She has developed tics - and would like to remove the IUD to see if this is playing a role.     For contraception- would like paragard.  Is nervous because her last IUD insertion was very painful.  Discussed options for pain control/anxiety.  Valium sent, would like paracervical block.     History obtained from : patient  Review of Systems        Objective     /62 (BP Location: Right arm, Patient Position: Sitting, Cuff Size: Standard)   Ht 5' 2\" (1.575 m)   Wt 63.5 kg (140 lb)   LMP  (Exact Date)   BMI 25.61 kg/m²     Physical Exam  Vitals and nursing note reviewed.   Constitutional:       Appearance: Normal appearance.   Genitourinary:     General: Normal vulva.      Labia:         Right: No rash or lesion.         " Left: No rash or lesion.       Vagina: Normal.      Cervix: Normal.      Comments: IUD strings not clearly visible  Neurological:      Mental Status: She is alert.       IUD Procedure    Date/Time: 10/4/2024 3:05 PM    Performed by: Olga Boyer MD  Authorized by: Olga Boyer MD    Verbal consent obtained?: Yes    Risks and benefits: Risks, benefits and alternatives were discussed    Consent given by:  Patient  Patient identity confirmed:  Verbally with patient  Select procedure: IUD removal    IUD Removal:     Reason for removal: patient request      Removed without complications: yes      Strings visualized: no      IUD intact: yes    Removal Comments:      Sanjuanita clamp used to grasp IUD strings in cervical os, removed without difficulty.

## 2024-10-07 ENCOUNTER — APPOINTMENT (OUTPATIENT)
Dept: LAB | Facility: HOSPITAL | Age: 34
End: 2024-10-07
Payer: COMMERCIAL

## 2024-10-07 DIAGNOSIS — Z11.3 SCREENING FOR STD (SEXUALLY TRANSMITTED DISEASE): ICD-10-CM

## 2024-10-07 LAB
C TRACH DNA SPEC QL NAA+PROBE: NEGATIVE
HBV SURFACE AG SER QL: NORMAL
HCV AB SER QL: NORMAL
HIV 1+2 AB+HIV1 P24 AG SERPL QL IA: NORMAL
HIV 2 AB SERPL QL IA: NORMAL
HIV1 AB SERPL QL IA: NORMAL
HIV1 P24 AG SERPL QL IA: NORMAL
N GONORRHOEA DNA SPEC QL NAA+PROBE: NEGATIVE
TREPONEMA PALLIDUM IGG+IGM AB [PRESENCE] IN SERUM OR PLASMA BY IMMUNOASSAY: NORMAL

## 2024-10-07 PROCEDURE — 36415 COLL VENOUS BLD VENIPUNCTURE: CPT

## 2024-10-07 PROCEDURE — 87340 HEPATITIS B SURFACE AG IA: CPT

## 2024-10-07 PROCEDURE — 86803 HEPATITIS C AB TEST: CPT | Performed by: OBSTETRICS & GYNECOLOGY

## 2024-10-07 PROCEDURE — 86780 TREPONEMA PALLIDUM: CPT

## 2024-10-07 PROCEDURE — 87389 HIV-1 AG W/HIV-1&-2 AB AG IA: CPT

## 2024-10-21 ENCOUNTER — PROCEDURE VISIT (OUTPATIENT)
Age: 34
End: 2024-10-21
Payer: COMMERCIAL

## 2024-10-21 VITALS
BODY MASS INDEX: 25.21 KG/M2 | DIASTOLIC BLOOD PRESSURE: 64 MMHG | SYSTOLIC BLOOD PRESSURE: 108 MMHG | HEIGHT: 62 IN | WEIGHT: 137 LBS

## 2024-10-21 DIAGNOSIS — Z32.02 NEGATIVE PREGNANCY TEST: ICD-10-CM

## 2024-10-21 DIAGNOSIS — Z30.430 ENCOUNTER FOR INSERTION OF PARAGARD IUD: Primary | ICD-10-CM

## 2024-10-21 LAB — SL AMB POCT URINE HCG: NEGATIVE

## 2024-10-21 PROCEDURE — 81025 URINE PREGNANCY TEST: CPT | Performed by: OBSTETRICS & GYNECOLOGY

## 2024-10-21 PROCEDURE — 58300 INSERT INTRAUTERINE DEVICE: CPT | Performed by: OBSTETRICS & GYNECOLOGY

## 2024-10-21 RX ORDER — COPPER 313.4 MG/1
INTRAUTERINE DEVICE INTRAUTERINE
Status: COMPLETED | OUTPATIENT
Start: 2024-10-21 | End: 2024-10-21

## 2024-10-21 RX ADMIN — COPPER: 313.4 INTRAUTERINE DEVICE INTRAUTERINE at 13:50

## 2024-10-21 NOTE — PROGRESS NOTES
IUD Procedure    Date/Time: 10/21/2024 1:50 PM    Performed by: Olga Boyer MD  Authorized by: Olga Boyer MD    Verbal consent obtained?: Yes    Written consent obtained?: Yes    Risks and benefits: Risks, benefits and alternatives were discussed    Consent given by:  Patient  Patient states understanding of procedure being performed: Yes    Required items: Required blood products, implants, devices and special equipment available    Patient identity confirmed:  Verbally with patient  Select procedure: IUD insertion    IUD Insertion:     Pelvic exam performed: yes      Negative urine pregnancy test: yes      Cervix cleaned and prepped: yes      Speculum placed in vagina: yes      Tenaculum applied to cervix: yes      IUD inserted with no complications: yes      Strings trimmed: yes      Uterus sounded: yes      Uterus sound depth (cm):  8    IUD type:  Intrauterine copper  Post-procedure:     Patient tolerated procedure well: yes      Patient will follow up after next period: yes

## 2024-10-23 ENCOUNTER — TELEMEDICINE (OUTPATIENT)
Dept: PSYCHIATRY | Facility: CLINIC | Age: 34
End: 2024-10-23
Payer: COMMERCIAL

## 2024-10-23 DIAGNOSIS — G47.00 INSOMNIA, UNSPECIFIED TYPE: ICD-10-CM

## 2024-10-23 DIAGNOSIS — F41.1 GAD (GENERALIZED ANXIETY DISORDER): ICD-10-CM

## 2024-10-23 PROCEDURE — 99214 OFFICE O/P EST MOD 30 MIN: CPT | Performed by: STUDENT IN AN ORGANIZED HEALTH CARE EDUCATION/TRAINING PROGRAM

## 2024-10-23 RX ORDER — BUSPIRONE HYDROCHLORIDE 30 MG/1
30 TABLET ORAL 2 TIMES DAILY
Qty: 60 TABLET | Refills: 2 | Status: SHIPPED | OUTPATIENT
Start: 2024-10-23 | End: 2024-10-26 | Stop reason: SDUPTHER

## 2024-10-23 RX ORDER — TRAZODONE HYDROCHLORIDE 50 MG/1
50 TABLET, FILM COATED ORAL DAILY
Qty: 30 TABLET | Refills: 2 | Status: SHIPPED | OUTPATIENT
Start: 2024-10-23 | End: 2025-01-21

## 2024-10-23 RX ORDER — VENLAFAXINE HYDROCHLORIDE 37.5 MG/1
37.5 CAPSULE, EXTENDED RELEASE ORAL DAILY
Qty: 30 CAPSULE | Refills: 0 | Status: SHIPPED | OUTPATIENT
Start: 2024-10-23 | End: 2024-11-22

## 2024-10-26 RX ORDER — BUSPIRONE HYDROCHLORIDE 30 MG/1
30 TABLET ORAL 2 TIMES DAILY
Qty: 60 TABLET | Refills: 2 | Status: SHIPPED | OUTPATIENT
Start: 2024-10-26 | End: 2025-01-24

## 2024-10-26 NOTE — PSYCH
MEDICATION MANAGEMENT NOTE        [facility]      Name and Date of Birth:  Debbie Yeung 33 y.o. 1990 MRN: [id number]    Date of Visit: October 26, 2024    Reason for Visit: Follow-up visit for medication management     Virtual Visit Disclaimer:       TeleMed provider: Brissa Jay D.O.    Location: [place]     Verification of patient location:     Patient is currently located in the [facility]  Patient is currently located in a state in which I am licensed     After connecting through [employer], the patient was identified by name and date of birth.  Debbie Yeung was informed that this is a telemedicine visit that is being conducted through [employer], and the patient was informed that this is a secure, HIPAA-compliant platform. My office door was closed. No one else was in the room. Debbie Yeung acknowledged consent and understanding of privacy and security of the video platform. Debbie understands that the online visit is based solely on information provided by the patient, and that, in the absence of a face-to-face physical evaluation by the physician, the diagnosis Debbie  receives is both limited and provisional in terms of accuracy and completeness. Debbie Yeung understands that they can discontinue the visit at any time. I informed Debbie that I have reviewed their record in EPIC and presented the opportunity for them to ask any questions regarding the visit today. Debbie Yeung voiced understanding and consented to these terms. Debbie is aware this is a billable service. Debbie is present at her home residence and primary address      SUBJECTIVE:    Debbie Yeung is a 33 y.o. female with past psychiatric history significant for  who was personally seen and evaluated today at the [facility] outpatient clinic for follow-up and medication management. Debbie denies SI, HI, AVH, delusions, nikunj since her last  appointment.  Patient does continue to endorse feelings of anxiety and depression and after discussion of risks, benefits, potential side effects, alternatives, we will initiate venlafaxine and discontinue Lexapro.  Patient also admits that she was not taking the Abilify anymore because she felt as if it may make her more hungry but could not completely prove this and so would like to revisit this at a later time.  She denies acute mental complaints or concerns at this time      Current Rating Scores:     None completed today.    Review Of Systems:      Constitutional negative   ENT negative   Cardiovascular negative   Respiratory negative   Gastrointestinal negative   Genitourinary negative   Musculoskeletal negative   Integumentary negative   Neurological Occasional tics   Endocrine negative   Other Symptoms none, all other systems are negative       Past Psychiatric History: (unchanged information from previous note copied and italicized) - Information that is bolded has been updated.     See intake    Substance Abuse History: (unchanged information from previous note copied and italicized) - Information that is bolded has been updated.     See intake    Social History: (unchanged information from previous note copied and italicized) - Information that is bolded has been updated.     See intake    Traumatic History: (unchanged information from previous note copied and italicized) - Information that is bolded has been updated.     See intake      Past Medical History:    Past Medical History:   Diagnosis Date    Acne     Anxiety     Depression         No past surgical history on file.  No Known Allergies    Substance Abuse History:    Social History     Substance and Sexual Activity   Alcohol Use Never     Social History     Substance and Sexual Activity   Drug Use Not Currently       Social History:    Social History     Socioeconomic History    Marital status: Single     Spouse name: Not on file    Number of  children: Not on file    Years of education: Not on file    Highest education level: Not on file   Occupational History    Not on file   Tobacco Use    Smoking status: Former     Current packs/day: 0.00     Average packs/day: 0.3 packs/day for 10.0 years (2.5 ttl pk-yrs)     Types: Cigarettes     Start date: 1/15/2010     Quit date: 1/15/2020     Years since quittin.7    Smokeless tobacco: Never   Vaping Use    Vaping status: Never Used   Substance and Sexual Activity    Alcohol use: Never    Drug use: Not Currently    Sexual activity: Yes     Partners: Male     Birth control/protection: I.U.D.   Other Topics Concern    Not on file   Social History Narrative    Not on file     Social Determinants of Health     Financial Resource Strain: Not on file   Food Insecurity: Not on file   Transportation Needs: Not on file   Physical Activity: Not on file   Stress: Not on file   Social Connections: Not on file   Intimate Partner Violence: Not on file   Housing Stability: Not on file       Family Psychiatric History:     Family History   Problem Relation Age of Onset    Heart attack Mother     Hypertension Mother     Acne Father     No Known Problems Sister     No Known Problems Brother     Hypertension Maternal Grandmother     Diabetes Maternal Grandmother     Hyperlipidemia Maternal Grandfather     Hypertension Maternal Grandfather     Diabetes Maternal Grandfather     Heart attack Paternal Grandmother     Heart disease Paternal Grandmother     Breast cancer Paternal Grandmother     Stroke Paternal Grandmother     Asthma Paternal Grandfather        History Review: The following portions of the patient's history were reviewed and updated as appropriate: allergies, current medications, past family history, past medical history, past social history, past surgical history, and problem list.         OBJECTIVE:     Vital signs in last 24 hours:    There were no vitals filed for this visit.    Mental Status  "Evaluation:    Appearance age appropriate, casually dressed   Behavior cooperative, mildly anxious   Speech normal rate, normal volume, normal pitch   Mood \"Better\"   Affect constricted   Thought Processes organized, goal directed   Associations intact associations   Thought Content no overt delusions   Perceptual Disturbances: no auditory hallucinations, no visual hallucinations   Abnormal Thoughts  Risk Potential Suicidal ideation - None  Homicidal ideation - None  Potential for aggression - No   Orientation oriented to person, place, time/date, and situation   Memory recent and remote memory grossly intact   Consciousness alert and awake   Attention Span Concentration Span attention span and concentration are age appropriate   Intellect appears to be of average intelligence   Insight intact   Judgement intact   Muscle Strength and  Gait unable to assess today due to virtual visit   Motor activity unable to assess today due to virtual visit   Language no difficulty naming common objects, no difficulty repeating a phrase   Fund of Knowledge adequate knowledge of current events  adequate fund of knowledge regarding past history   Pain none   Pain Scale Did not ask patient to formally rate       Laboratory Results: I have personally reviewed all pertinent laboratory/tests results    Recent Labs (last 2 months):   Procedure visit on 10/21/2024   Component Date Value    URINE HCG 10/21/2024 negative    Appointment on 10/07/2024   Component Date Value    Hepatitis B Surface Ag 10/07/2024 Non-reactive     Syphilis Total Antibody 10/07/2024 Non-reactive     HIV-1 p24 Antigen 10/07/2024 Non-Reactive     HIV-1 Antibody 10/07/2024 Non-Reactive     HIV-2 Antibody 10/07/2024 Non-Reactive     HIV Ag-Ab 5th Gen 10/07/2024 Non-Reactive    Procedure visit on 10/04/2024   Component Date Value    N gonorrhoeae, DNA Probe 10/04/2024 Negative     Chlamydia trachomatis, D* 10/04/2024 Negative     Hepatitis C Ab 10/07/2024 Non-reactive  "       Suicide/Homicide Risk Assessment:    Risk of Harm to Self:  The following ratings are based on assessment at the time of the interview  Historical Risk Factors include: chronic psychiatric problems  Protective Factors: no current suicidal ideation, access to mental health treatment, compliant with medications, compliant with mental health treatment, having a desire to be alive, responsibilities and duties to others, stable living environment, stable job, strong relationships    Risk of Harm to Others:  The following ratings are based on assessment at the time of the interview  Historical Risk Factors include: none.  Protective Factors: no current homicidal ideation    The following interventions are recommended: contracts for safety at present - agrees to go to ED if feeling unsafe, contracts for safety at present - agrees to call Crisis Intervention Service if feeling unsafe      Lethality Statement:    Based on today's assessment and clinical criteria, Debbie Yeung contracts for safety and is not an imminent risk of harm to self or others. Outpatient level of care is deemed appropriate at this current time. Debbie understands that if they can no longer contract for safety, they need to call the office or report to their nearest Emergency Room for immediate evaluation. They voiced understanding and agreement to call 911 or head to the nearest ED should they have any physical or mental decompensation whatsoever.       Assessment/Plan:     1.)  PAMELA  2.)  Provisional Tic disorder  3.)          After discussion of risks, benefits, potential side effects, alternatives, we will discontinue Abilify, continue BuSpar 30 mg twice daily, trazodone 50 mg nightly for insomnia, discontinue Lexapro, initiate venlafaxine 37.5 mg daily.  Patient will follow-up with neurology for her tic disorder          Medications Risks/Benefits      Risks, Benefits And Possible Side Effects Of Medications:    Risks, benefits, and  possible side effects of medications explained to Debbie and she verbalizes understanding and agreement for treatment.  Risks of medications in pregnancy explained to Debbie. She verbalizes understanding and agrees to notify her doctor if she becomes pregnant.    Controlled Medication Discussion:     Not applicable    Psychotherapy Provided:     Individual psychotherapy provided: Crisis/safety plan discussed with Debbie.     Treatment Plan:    Completed and signed during the session:  We will complete at next appointment due to lack of time today      Visit Time    Visit Start Time: 1:30 PM  Visit Stop Time: 1:50 PM  Total Visit Duration:  20 minutes     The total visit duration detailed above includes: patient engagement, medication management, psychotherapy/counseling, discussion regarding treatment goals, documentation, review of past medical records, and coordination of care.      Note Share Disclaimer:     This note was not shared with the patient due to reasonable likelihood of causing patient harm      Brissa Jay DO  Psychiatry  10/26/24

## 2024-10-30 ENCOUNTER — TELEPHONE (OUTPATIENT)
Dept: PSYCHIATRY | Facility: CLINIC | Age: 34
End: 2024-10-30

## 2024-11-05 ENCOUNTER — TELEPHONE (OUTPATIENT)
Dept: PSYCHIATRY | Facility: CLINIC | Age: 34
End: 2024-11-05

## 2024-11-05 NOTE — TELEPHONE ENCOUNTER
Called pt and left VM regarding scheduling f/u appt with Dr. Jay.    Gave [facility] phone number to call back and schedule.

## 2024-11-06 NOTE — TELEPHONE ENCOUNTER
Patient called in to schedule their follow up appointment with their  provider.    Patient is now scheduled on 11/21 @2:30 virtually

## 2024-11-13 RX ORDER — DOXYCYCLINE 100 MG/1
CAPSULE ORAL
Qty: 60 CAPSULE | OUTPATIENT
Start: 2024-11-13

## 2024-11-13 NOTE — TELEPHONE ENCOUNTER
Per Deo phone call to patient to about refill patient mentioned that Pharm contacted her o see if she wanted a refill; so patient said to them to just refill; but patient doesn't need it right now; patient said she will call back to sched if needs to

## 2024-11-13 NOTE — TELEPHONE ENCOUNTER
Patient looking for refills on Doxy 100 mg caps; patient last seen on 7/25/24; please refill or deny script

## 2024-11-13 NOTE — TELEPHONE ENCOUNTER
Just an FYI ; Pharm contacted pt to see if she wanted a refill; so patient said to them to just refill; but patient doesn't need it right now; patient said she will call back to sched if needs to

## 2024-11-21 ENCOUNTER — TELEMEDICINE (OUTPATIENT)
Dept: PSYCHIATRY | Facility: CLINIC | Age: 34
End: 2024-11-21
Payer: COMMERCIAL

## 2024-11-21 DIAGNOSIS — F41.1 GAD (GENERALIZED ANXIETY DISORDER): Primary | ICD-10-CM

## 2024-11-21 PROCEDURE — 99214 OFFICE O/P EST MOD 30 MIN: CPT | Performed by: STUDENT IN AN ORGANIZED HEALTH CARE EDUCATION/TRAINING PROGRAM

## 2024-11-21 RX ORDER — VENLAFAXINE HYDROCHLORIDE 75 MG/1
75 TABLET, EXTENDED RELEASE ORAL
Qty: 30 TABLET | Refills: 0 | Status: SHIPPED | OUTPATIENT
Start: 2024-11-21 | End: 2024-12-21

## 2024-11-26 RX ORDER — BUSPIRONE HYDROCHLORIDE 30 MG/1
30 TABLET ORAL 2 TIMES DAILY
Qty: 180 TABLET | Refills: 0 | Status: SHIPPED | OUTPATIENT
Start: 2024-11-26 | End: 2025-02-24

## 2024-11-26 NOTE — PSYCH
MEDICATION MANAGEMENT NOTE        [facility]      Name and Date of Birth:  Debbie Yeung 33 y.o. 1990 MRN: [id number]    Date of Visit: November 26, 2024    Reason for Visit: Follow-up visit for medication management     Virtual Visit Disclaimer:       TeleMed provider: Brissa Jay D.O.    Location: [place]     Verification of patient location:     Patient is currently located in the [facility]  Patient is currently located in a state in which I am licensed     After connecting through [employer] [place], the patient was identified by name and date of birth.  Debbie Yeung was informed that this is a telemedicine visit that is being conducted through [email or web address], and the patient was informed that this is a secure, HIPAA-compliant platform. My office door was closed. No one else was in the room. Debbie Yeung acknowledged consent and understanding of privacy and security of the video platform. Debbie understands that the online visit is based solely on information provided by the patient, and that, in the absence of a face-to-face physical evaluation by the physician, the diagnosis Debbie  receives is both limited and provisional in terms of accuracy and completeness. Debbie Yeung understands that they can discontinue the visit at any time. I informed Debbie that I have reviewed their record in [facility] and presented the opportunity for them to ask any questions regarding the visit today. Debbie Yeung voiced understanding and consented to these terms. Debbie is aware this is a billable service. Debbie is present at her home residence and primary address      SUBJECTIVE:    Debbie Yeung is a 33 y.o. female with past psychiatric history significant for  who was personally seen and evaluated today at the [facility] outpatient clinic for follow-up and medication management. Debbie denies SI, HI, AVH, delusions, nikunj since her last  appointment.  Patient noted improvement on venlafaxine and after discussion of risks, benefits, potential side effects, alternatives, we will increase dose to 75 mg daily of venlafaxine.  She denies acute mental complaints or concerns at this time      Current Rating Scores:     None completed today.    Review Of Systems:      Constitutional negative   ENT negative   Cardiovascular negative   Respiratory negative   Gastrointestinal negative   Genitourinary negative   Musculoskeletal negative   Integumentary negative   Neurological Occasional tics   Endocrine negative   Other Symptoms none, all other systems are negative       Past Psychiatric History: (unchanged information from previous note copied and italicized) - Information that is bolded has been updated.     See intake    Substance Abuse History: (unchanged information from previous note copied and italicized) - Information that is bolded has been updated.     See intake    Social History: (unchanged information from previous note copied and italicized) - Information that is bolded has been updated.     See intake    Traumatic History: (unchanged information from previous note copied and italicized) - Information that is bolded has been updated.     See intake      Past Medical History:    Past Medical History:   Diagnosis Date    Acne     Anxiety     Depression         No past surgical history on file.  No Known Allergies    Substance Abuse History:    Social History     Substance and Sexual Activity   Alcohol Use Never     Social History     Substance and Sexual Activity   Drug Use Not Currently       Social History:    Social History     Socioeconomic History    Marital status: Single     Spouse name: Not on file    Number of children: Not on file    Years of education: Not on file    Highest education level: Not on file   Occupational History    Not on file   Tobacco Use    Smoking status: Former     Current packs/day: 0.00     Average packs/day: 0.3  "packs/day for 10.0 years (2.5 ttl pk-yrs)     Types: Cigarettes     Start date: 1/15/2010     Quit date: 1/15/2020     Years since quittin.8    Smokeless tobacco: Never   Vaping Use    Vaping status: Never Used   Substance and Sexual Activity    Alcohol use: Never    Drug use: Not Currently    Sexual activity: Yes     Partners: Male     Birth control/protection: I.U.D.   Other Topics Concern    Not on file   Social History Narrative    Not on file     Social Drivers of Health     Financial Resource Strain: Not on file   Food Insecurity: Not on file   Transportation Needs: Not on file   Physical Activity: Not on file   Stress: Not on file   Social Connections: Not on file   Intimate Partner Violence: Not on file   Housing Stability: Not on file       Family Psychiatric History:     Family History   Problem Relation Age of Onset    Heart attack Mother     Hypertension Mother     Acne Father     No Known Problems Sister     No Known Problems Brother     Hypertension Maternal Grandmother     Diabetes Maternal Grandmother     Hyperlipidemia Maternal Grandfather     Hypertension Maternal Grandfather     Diabetes Maternal Grandfather     Heart attack Paternal Grandmother     Heart disease Paternal Grandmother     Breast cancer Paternal Grandmother     Stroke Paternal Grandmother     Asthma Paternal Grandfather        History Review: The following portions of the patient's history were reviewed and updated as appropriate: allergies, current medications, past family history, past medical history, past social history, past surgical history, and problem list.         OBJECTIVE:     Vital signs in last 24 hours:    There were no vitals filed for this visit.    Mental Status Evaluation:    Appearance age appropriate, casually dressed   Behavior cooperative, mildly anxious   Speech normal rate, normal volume, normal pitch   Mood \"Better\"   Affect constricted   Thought Processes organized, goal directed   Associations intact " associations   Thought Content no overt delusions   Perceptual Disturbances: no auditory hallucinations, no visual hallucinations   Abnormal Thoughts  Risk Potential Suicidal ideation - None  Homicidal ideation - None  Potential for aggression - No   Orientation oriented to person, place, time/date, and situation   Memory recent and remote memory grossly intact   Consciousness alert and awake   Attention Span Concentration Span attention span and concentration are age appropriate   Intellect appears to be of average intelligence   Insight intact   Judgement intact   Muscle Strength and  Gait unable to assess today due to virtual visit   Motor activity unable to assess today due to virtual visit   Language no difficulty naming common objects, no difficulty repeating a phrase   Fund of Knowledge adequate knowledge of current events  adequate fund of knowledge regarding past history   Pain none   Pain Scale Did not ask patient to formally rate       Laboratory Results: I have personally reviewed all pertinent laboratory/tests results    Recent Labs (last 2 months):   Procedure visit on 10/21/2024   Component Date Value    URINE HCG 10/21/2024 negative    Appointment on 10/07/2024   Component Date Value    Hepatitis B Surface Ag 10/07/2024 Non-reactive     Syphilis Total Antibody 10/07/2024 Non-reactive     HIV-1 p24 Antigen 10/07/2024 Non-Reactive     HIV-1 Antibody 10/07/2024 Non-Reactive     HIV-2 Antibody 10/07/2024 Non-Reactive     HIV Ag-Ab 5th Gen 10/07/2024 Non-Reactive    Procedure visit on 10/04/2024   Component Date Value    N gonorrhoeae, DNA Probe 10/04/2024 Negative     Chlamydia trachomatis, D* 10/04/2024 Negative     Hepatitis C Ab 10/07/2024 Non-reactive        Suicide/Homicide Risk Assessment:    Risk of Harm to Self:  The following ratings are based on assessment at the time of the interview  Historical Risk Factors include: chronic psychiatric problems  Protective Factors: no current suicidal  ideation, access to mental health treatment, compliant with medications, compliant with mental health treatment, having a desire to be alive, responsibilities and duties to others, stable living environment, stable job, strong relationships    Risk of Harm to Others:  The following ratings are based on assessment at the time of the interview  Historical Risk Factors include: none.  Protective Factors: no current homicidal ideation    The following interventions are recommended: contracts for safety at present - agrees to go to ED if feeling unsafe, contracts for safety at present - agrees to call Crisis Intervention Service if feeling unsafe      Lethality Statement:    Based on today's assessment and clinical criteria, Debbie Yeung contracts for safety and is not an imminent risk of harm to self or others. Outpatient level of care is deemed appropriate at this current time. Debbie understands that if they can no longer contract for safety, they need to call the office or report to their nearest Emergency Room for immediate evaluation. They voiced understanding and agreement to call 911 or head to the nearest ED should they have any physical or mental decompensation whatsoever.       Assessment/Plan:     1.)  PAMELA  2.)  Provisional Tic disorder  3.)          After discussion of risks, benefits, potential side effects, alternatives, we will continue BuSpar 30 mg twice daily, trazodone 50 mg nightly for insomnia, increase venlafaxine to 75 mg daily.  Patient will follow neurology for tics.  She denies acute mental complaints or concerns at this time          Medications Risks/Benefits      Risks, Benefits And Possible Side Effects Of Medications:    Risks, benefits, and possible side effects of medications explained to Debbie and she verbalizes understanding and agreement for treatment.  Risks of medications in pregnancy explained to Debbie. She verbalizes understanding and agrees to notify her doctor if she  becomes pregnant.    Controlled Medication Discussion:     Not applicable    Psychotherapy Provided:     Individual psychotherapy provided: Crisis/safety plan discussed with Debbie.     Treatment Plan:    Completed and signed during the session:  We will complete at next appointment due to lack of time today      Visit Time    Visit Start Time: 1:30 PM  Visit Stop Time: 1:50 PM  Total Visit Duration:  20 minutes     The total visit duration detailed above includes: patient engagement, medication management, psychotherapy/counseling, discussion regarding treatment goals, documentation, review of past medical records, and coordination of care.      Note Share Disclaimer:     This note was not shared with the patient due to reasonable likelihood of causing patient harm      Brissa Jay DO  Psychiatry  11/26/24

## 2024-11-29 ENCOUNTER — TELEPHONE (OUTPATIENT)
Dept: PSYCHIATRY | Facility: CLINIC | Age: 34
End: 2024-11-29

## 2024-12-04 ENCOUNTER — OFFICE VISIT (OUTPATIENT)
Age: 34
End: 2024-12-04
Payer: COMMERCIAL

## 2024-12-04 VITALS
BODY MASS INDEX: 25.21 KG/M2 | SYSTOLIC BLOOD PRESSURE: 110 MMHG | WEIGHT: 137 LBS | HEIGHT: 62 IN | DIASTOLIC BLOOD PRESSURE: 62 MMHG

## 2024-12-04 DIAGNOSIS — Z30.431 IUD CHECK UP: Primary | ICD-10-CM

## 2024-12-04 PROCEDURE — 99212 OFFICE O/P EST SF 10 MIN: CPT | Performed by: OBSTETRICS & GYNECOLOGY

## 2024-12-04 RX ORDER — DOXYCYCLINE 100 MG/1
CAPSULE ORAL
COMMUNITY
Start: 2024-10-07

## 2024-12-04 RX ORDER — CLINDAMYCIN PHOSPHATE 10 MG/ML
SOLUTION TOPICAL
COMMUNITY
Start: 2024-10-07

## 2024-12-04 NOTE — PROGRESS NOTES
Debbie Anjana  1990      CC: IUD check    S: 33 y.o. female here for IUD check.  She is status post placement of a paragard IUD on 10/21/2024.   She had one normal cycle -which was not bad.  She has had no pain or other side effects.   Patient's last menstrual period was 2024.    Current Outpatient Medications:     busPIRone (BUSPAR) 30 MG tablet, Take 1 tablet (30 mg total) by mouth 2 (two) times a day, Disp: 180 tablet, Rfl: 0    clindamycin (CLEOCIN T) 1 %, USE 1 SWAB TWICE A DAY, Disp: , Rfl:     traZODone (DESYREL) 50 mg tablet, Take 1 tablet (50 mg total) by mouth daily, Disp: 30 tablet, Rfl: 2    tretinoin (RETIN-A) 0.025 % cream, Apply pea sized amount to entire face nightly after moisturizing; may skip a night if too drying, Disp: 45 g, Rfl: 3    venlafaxine 75 mg 24 hr tablet, Take 1 tablet (75 mg total) by mouth daily with breakfast, Disp: 30 tablet, Rfl: 0    doxycycline monohydrate (MONODOX) 100 mg capsule, PLEASE SEE ATTACHED FOR DETAILED DIRECTIONS (Patient not taking: Reported on 2024), Disp: , Rfl:   Social History     Socioeconomic History    Marital status: Single     Spouse name: Not on file    Number of children: Not on file    Years of education: Not on file    Highest education level: Not on file   Occupational History    Not on file   Tobacco Use    Smoking status: Former     Current packs/day: 0.00     Average packs/day: 0.3 packs/day for 10.0 years (2.5 ttl pk-yrs)     Types: Cigarettes     Start date: 1/15/2010     Quit date: 1/15/2020     Years since quittin.8    Smokeless tobacco: Never   Vaping Use    Vaping status: Never Used   Substance and Sexual Activity    Alcohol use: Never    Drug use: Not Currently    Sexual activity: Yes     Partners: Male     Birth control/protection: I.U.D.   Other Topics Concern    Not on file   Social History Narrative    Not on file     Social Drivers of Health     Financial Resource Strain: Not on file   Food Insecurity: Not on file  "  Transportation Needs: Not on file   Physical Activity: Not on file   Stress: Not on file   Social Connections: Not on file   Intimate Partner Violence: Not on file   Housing Stability: Not on file     Family History   Problem Relation Age of Onset    Heart attack Mother     Hypertension Mother     Acne Father     No Known Problems Sister     No Known Problems Brother     Hypertension Maternal Grandmother     Diabetes Maternal Grandmother     Hyperlipidemia Maternal Grandfather     Hypertension Maternal Grandfather     Diabetes Maternal Grandfather     Heart attack Paternal Grandmother     Heart disease Paternal Grandmother     Breast cancer Paternal Grandmother     Stroke Paternal Grandmother     Asthma Paternal Grandfather      Past Medical History:   Diagnosis Date    Acne     Anxiety     Depression        O:  Blood pressure 110/62, height 5' 2\" (1.575 m), weight 62.1 kg (137 lb), last menstrual period 11/07/2024, not currently breastfeeding.    Abdomen is soft and nontender  External genitals are normal without rashes or lesions  Vagina is normal without discharge or bleeding  Cervix is normal without discharge or lesion. IUD strings are normal.     A:  IUD in place    P:  Patient was reassured that irregular bleeding is common for the first six months of IUD use and that this should slowly resolve over time.  She will call with any persistently abnormal bleeding or other problems.  She will return for her yearly exam as scheduled.     "

## 2024-12-19 ENCOUNTER — TELEMEDICINE (OUTPATIENT)
Dept: PSYCHIATRY | Facility: CLINIC | Age: 34
End: 2024-12-19
Payer: COMMERCIAL

## 2024-12-19 DIAGNOSIS — F41.1 GAD (GENERALIZED ANXIETY DISORDER): ICD-10-CM

## 2024-12-19 PROCEDURE — 99213 OFFICE O/P EST LOW 20 MIN: CPT | Performed by: STUDENT IN AN ORGANIZED HEALTH CARE EDUCATION/TRAINING PROGRAM

## 2024-12-23 RX ORDER — VENLAFAXINE HYDROCHLORIDE 75 MG/1
75 TABLET, EXTENDED RELEASE ORAL
Qty: 90 TABLET | Refills: 0 | Status: SHIPPED | OUTPATIENT
Start: 2024-12-23 | End: 2025-03-23

## 2024-12-23 NOTE — PSYCH
MEDICATION MANAGEMENT NOTE        [facility]      Name and Date of Birth:  Debbie Yeung 33 y.o. 1990 MRN: [id number]    Date of Visit: December 23, 2024    Reason for Visit: Follow-up visit for medication management     Virtual Visit Disclaimer:       TeleMed provider: Brissa Jay D.O.    Location: [place]     Verification of patient location:     Patient is currently located in the [facility]  Patient is currently located in a state in which I am licensed     After connecting through [place] [employer], the patient was identified by name and date of birth.  Debbie Yeung was informed that this is a telemedicine visit that is being conducted through [employer] and the patient was informed that this is a secure, HIPAA-compliant platform. My office door was closed. No one else was in the room. Debbie Yeung acknowledged consent and understanding of privacy and security of the video platform. Debbie understands that the online visit is based solely on information provided by the patient, and that, in the absence of a face-to-face physical evaluation by the physician, the diagnosis Debbie  receives is both limited and provisional in terms of accuracy and completeness. Debbie Yeung understands that they can discontinue the visit at any time. I informed Debbie that I have reviewed their record in EPIC and presented the opportunity for them to ask any questions regarding the visit today. Debbie Yeung voiced understanding and consented to these terms. Debbie is aware this is a billable service. Debbie is present in [place]      SUBJECTIVE:    Debbie Yeung is a 33 y.o. female with past psychiatric history significant for  who was personally seen and evaluated today at the [facility] outpatient clinic for follow-up and medication management. Debbie denies SI, HI, AVH, delusions, nikunj since her last appointment.  Patient noted improvement on  venlafaxine 75 mg every morning.  After discussion of risks, benefits, potential side effects, alternatives, patient wished to discontinue buspirone as she felt well enough and wanted to decrease her medication burden.  Patient will take buspirone 15 mg twice daily by cutting her current tablets in half for 1 month and then thereafter discontinue buspirone altogether.  Patient denies acute mental complaints or concerns at this time      Current Rating Scores:     None completed today.    Review Of Systems:      Constitutional negative   ENT negative   Cardiovascular negative   Respiratory negative   Gastrointestinal negative   Genitourinary negative   Musculoskeletal negative   Integumentary negative   Neurological Occasional tics   Endocrine negative   Other Symptoms none, all other systems are negative       Past Psychiatric History: (unchanged information from previous note copied and italicized) - Information that is bolded has been updated.     See intake    Substance Abuse History: (unchanged information from previous note copied and italicized) - Information that is bolded has been updated.     See intake    Social History: (unchanged information from previous note copied and italicized) - Information that is bolded has been updated.     See intake    Traumatic History: (unchanged information from previous note copied and italicized) - Information that is bolded has been updated.     See intake      Past Medical History:    Past Medical History:   Diagnosis Date    Acne     Anxiety     Depression         No past surgical history on file.  No Known Allergies    Substance Abuse History:    Social History     Substance and Sexual Activity   Alcohol Use Never     Social History     Substance and Sexual Activity   Drug Use Not Currently       Social History:    Social History     Socioeconomic History    Marital status: Single     Spouse name: Not on file    Number of children: Not on file    Years of education:  Not on file    Highest education level: Not on file   Occupational History    Not on file   Tobacco Use    Smoking status: Former     Current packs/day: 0.00     Average packs/day: 0.3 packs/day for 10.0 years (2.5 ttl pk-yrs)     Types: Cigarettes     Start date: 1/15/2010     Quit date: 1/15/2020     Years since quittin.9    Smokeless tobacco: Never   Vaping Use    Vaping status: Never Used   Substance and Sexual Activity    Alcohol use: Never    Drug use: Not Currently    Sexual activity: Yes     Partners: Male     Birth control/protection: I.U.D.   Other Topics Concern    Not on file   Social History Narrative    Not on file     Social Drivers of Health     Financial Resource Strain: Not on file   Food Insecurity: Not on file   Transportation Needs: Not on file   Physical Activity: Not on file   Stress: Not on file   Social Connections: Not on file   Intimate Partner Violence: Not on file   Housing Stability: Not on file       Family Psychiatric History:     Family History   Problem Relation Age of Onset    Heart attack Mother     Hypertension Mother     Acne Father     No Known Problems Sister     No Known Problems Brother     Hypertension Maternal Grandmother     Diabetes Maternal Grandmother     Hyperlipidemia Maternal Grandfather     Hypertension Maternal Grandfather     Diabetes Maternal Grandfather     Heart attack Paternal Grandmother     Heart disease Paternal Grandmother     Breast cancer Paternal Grandmother     Stroke Paternal Grandmother     Asthma Paternal Grandfather        History Review: The following portions of the patient's history were reviewed and updated as appropriate: allergies, current medications, past family history, past medical history, past social history, past surgical history, and problem list.         OBJECTIVE:     Vital signs in last 24 hours:    There were no vitals filed for this visit.    Mental Status Evaluation:    Appearance age appropriate, casually dressed  "  Behavior Cooperative, calm   Speech normal rate, normal volume, normal pitch   Mood \"Better\"   Affect constricted   Thought Processes organized, goal directed   Associations intact associations   Thought Content no overt delusions   Perceptual Disturbances: no auditory hallucinations, no visual hallucinations   Abnormal Thoughts  Risk Potential Suicidal ideation - None  Homicidal ideation - None  Potential for aggression - No   Orientation oriented to person, place, time/date, and situation   Memory recent and remote memory grossly intact   Consciousness alert and awake   Attention Span Concentration Span attention span and concentration are age appropriate   Intellect appears to be of average intelligence   Insight intact   Judgement intact   Muscle Strength and  Gait unable to assess today due to virtual visit   Motor activity unable to assess today due to virtual visit   Language no difficulty naming common objects, no difficulty repeating a phrase   Fund of Knowledge adequate knowledge of current events  adequate fund of knowledge regarding past history   Pain none   Pain Scale Did not ask patient to formally rate       Laboratory Results: I have personally reviewed all pertinent laboratory/tests results    Recent Labs (last 2 months):   Procedure visit on 10/21/2024   Component Date Value    URINE HCG 10/21/2024 negative        Suicide/Homicide Risk Assessment:    Risk of Harm to Self:  The following ratings are based on assessment at the time of the interview  Historical Risk Factors include: chronic psychiatric problems  Protective Factors: no current suicidal ideation, access to mental health treatment, compliant with medications, compliant with mental health treatment, having a desire to be alive, responsibilities and duties to others, stable living environment, stable job, strong relationships    Risk of Harm to Others:  The following ratings are based on assessment at the time of the " interview  Historical Risk Factors include: none.  Protective Factors: no current homicidal ideation    The following interventions are recommended: contracts for safety at present - agrees to go to ED if feeling unsafe, contracts for safety at present - agrees to call Crisis Intervention Service if feeling unsafe      Lethality Statement:    Based on today's assessment and clinical criteria, Debbie Yeung contracts for safety and is not an imminent risk of harm to self or others. Outpatient level of care is deemed appropriate at this current time. Debbie understands that if they can no longer contract for safety, they need to call the office or report to their nearest Emergency Room for immediate evaluation. They voiced understanding and agreement to call 911 or head to the nearest ED should they have any physical or mental decompensation whatsoever.       Assessment/Plan:     1.)  PAMELA  2.)  Provisional Tic disorder  3.)          After discussion of risks, benefits, potential side effects, alternatives, we will continue venlafaxine 75 mg daily, trazodone 50 mg as needed for insomnia and decreased buspirone to 15 mg twice daily for 1 month and then discontinue it thereafter as patient stated that she felt well enough after her dose increase of venlafaxine that she does not want to be on buspirone anymore.  She denies acute mental complaints or concerns at this time          Medications Risks/Benefits      Risks, Benefits And Possible Side Effects Of Medications:    Risks, benefits, and possible side effects of medications explained to Debbie and she verbalizes understanding and agreement for treatment.  Risks of medications in pregnancy explained to Debbie. She verbalizes understanding and agrees to notify her doctor if she becomes pregnant.    Controlled Medication Discussion:     Not applicable    Psychotherapy Provided:     Individual psychotherapy provided: Crisis/safety plan discussed with Debbie.      Treatment Plan:    Completed and signed during the session:  We will complete at next appointment due to lack of time today      Visit Time    Visit Start Time: 1:30 PM  Visit Stop Time: 1:50 PM  Total Visit Duration:  20 minutes     The total visit duration detailed above includes: patient engagement, medication management, psychotherapy/counseling, discussion regarding treatment goals, documentation, review of past medical records, and coordination of care.      Note Share Disclaimer:     This note was not shared with the patient due to reasonable likelihood of causing patient harm      Brissa Jay DO  Psychiatry  12/23/24

## 2024-12-24 NOTE — PROGRESS NOTES
Name: Debbie Yeung      : 1990      MRN: [id number]  Encounter Provider: CINDI Otto  Encounter Date: 2024   Encounter department: NEUROLOGY [facility]  :  Assessment & Plan  Abnormal movements  Patient with 1-1/2-year history of new abnormal movements.  She describes a crawling type sensation of the back of her neck in which she will feel the need to roll her shoulders and bend her head forward multiple times to relieve the sensation.  She does states she feels some buildup of tension, and if she performs these movements multiple times it does provide a brief release of the sensation.  She does states she had some abnormal movements of the abdomen when she was a child however she cannot recall if she had a buildup of tension and release that would be seen with a motor tic disorder.  She does have a history of anxiety.    Given new onset of symptoms we will check labs.  She did have MRI brain at outside facility just prior to the onset of symptoms with which was without significant findings.    She did trial guanfacine however was concerned about hypotension.  Low-dose Abilify did somewhat improve her symptoms however had side effects of increased appetite and was concerned about weight gain.  Will plan to continue to treat as motor tic disorder and trial of topiramate, she will discuss with psych prior to starting.    If okay with psych, she will start topiramate 25 mg 1 tab at bedtime, if no improvement in 1 week can increase to 1 tab twice daily.  She should contact the office if no improvement in several weeks or experiences side effects.    Plan for follow-up in 3 months. To contact the office sooner with any concerns or worsening symptoms.        Orders:    Iron Panel (Includes Ferritin, Iron Sat%, Iron, and TIBC); Future    Vitamin B12; Future    Vitamin B6; Future    TSH + Free T4; Future    Comprehensive metabolic panel; Future    Vitamin D 25 hydroxy; Future     topiramate (Topamax) 25 mg tablet; Take 1 tab at bedtime for 1 week then 1 tab twice daily          History of Present Illness   HPI  Patient is a 33-year-old female with past medical history of anxiety/depression, iron deficiency anemia, insomnia  who presents for evaluation for possible tic disorder.    Does appear she was followed by [facility] neurology in the past for migraines that increased after Mirena IUD.    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 She feel she has a crawling type sensation across the back of her neck.  She will then have to roll her shoulders back or bend her head forward to relieve this sensation, she will feel a relief after she does the movements for a very brief period of time.  Hasn't been progressive since onset.   Caffeine does worsen symptoms.  No neck pain or arm weakness.     She states when she was a child and right after she gave birth to her son she would do some sort movement of her abdomen but unsure if she had a build up of tension and relief with this.     She does follow with psych, did try guafacine-was on this for a few weeks, did have lower end BP with this and was concerned about this but was not symptomatic and stopped med. Did also try abilify however she had increased appetite with this, was slightly helpful.   Did change add on venlafaxine which helped with some of her anxiety but not movements.   She did get her IUD taken out a few months ago and wasn't sure if this was contributing but no improvement.   She states there were no meds changes prior symptoms onset, was on lexapro for at least 10 years with no recent changes.   States she was on klonopin for a very long time, weaned off this over 6 years ago.     She does have a history of anxiety. No OCD, she does feel she has ADHD but was not formally diagnosed. Does have trauma from her childhood.    Her brother did have tics as a kid  "but improved.       Labs 9/2023: CMP, tsh, b12 normal, vit d 13    MRI brain 5/2023: The ventricles are of normal size and configuration.   There is no shift of midline structures, mass-effect, acute ischemia or evidence of intracranial hemorrhage.   No abnormal enhancement is noted.     No orbital abnormality.   No evidence of Chiari malformation.   Sella and pituitary gland appear within normal limits.     Review of Systems   Constitutional:  Negative for appetite change, fatigue and fever.   HENT: Negative.  Negative for hearing loss, tinnitus, trouble swallowing and voice change.    Eyes: Negative.  Negative for photophobia, pain and visual disturbance.   Respiratory: Negative.  Negative for shortness of breath.    Cardiovascular: Negative.  Negative for palpitations.   Gastrointestinal: Negative.  Negative for nausea and vomiting.   Endocrine: Negative.  Negative for cold intolerance.   Genitourinary: Negative.  Negative for dysuria, frequency and urgency.   Musculoskeletal:  Negative for back pain, gait problem, myalgias, neck pain and neck stiffness.   Skin: Negative.  Negative for rash.   Allergic/Immunologic: Negative.    Neurological: Negative.  Negative for dizziness, tremors, seizures, syncope, facial asymmetry, speech difficulty, weakness, light-headedness, numbness and headaches.        Started 6/2023- neck feels \"itchy\"- moves head/ shoulders- constant    Hematological: Negative.  Does not bruise/bleed easily.   Psychiatric/Behavioral: Negative.  Negative for confusion, hallucinations and sleep disturbance.    All other systems reviewed and are negative.    I have personally reviewed the MA's review of systems and made changes as necessary.         Objective   LMP 11/07/2024     Physical Exam  Constitutional:       General: She is awake.   HENT:      Right Ear: Hearing normal.      Left Ear: Hearing normal.   Eyes:      General: Lids are normal.      Extraocular Movements: Extraocular movements " intact.      Pupils: Pupils are equal, round, and reactive to light.   Neurological:      Mental Status: She is alert.      Motor: Motor strength is normal.     Deep Tendon Reflexes:      Reflex Scores:       Bicep reflexes are 2+ on the right side and 2+ on the left side.       Brachioradialis reflexes are 2+ on the right side and 2+ on the left side.       Patellar reflexes are 2+ on the right side and 2+ on the left side.       Achilles reflexes are 2+ on the right side and 2+ on the left side.  Psychiatric:         Speech: Speech normal.       Neurological Exam  Mental Status  Awake and alert. Oriented to person, place, time and situation. Speech is normal. Language is fluent with no aphasia.    Cranial Nerves  CN II: Visual fields full to confrontation.  CN III, IV, VI: Extraocular movements intact bilaterally. Normal lids and orbits bilaterally. Pupils equal round and reactive to light bilaterally.  CN V:  Right: Facial sensation is normal.  Left: Facial sensation is normal on the left.  CN VII:  Right: There is no facial weakness.  Left: There is no facial weakness.  CN VIII:  Right: Hearing is normal.  Left: Hearing is normal.  CN IX, X: Palate elevates symmetrically  CN XI:  Right: Trapezius strength is normal.  Left: Trapezius strength is normal.  CN XII: Tongue midline without atrophy or fasciculations.    Motor   Strength is 5/5 throughout all four extremities.    Sensory  Light touch is normal in upper and lower extremities. Temperature is normal in upper and lower extremities.     Reflexes                                            Right                      Left  Brachioradialis                    2+                         2+  Biceps                                 2+                         2+  Patellar                                2+                         2+  Achilles                                2+                         2+    Coordination  Right: Finger-to-nose normal. Rapid alternating  movement normal.Left: Finger-to-nose normal. Rapid alternating movement normal.  No involuntary abnormal movements noted during exam. .    Gait  Casual gait is normal including stance, stride, and arm swing. Able to rise from chair without using arms.

## 2024-12-26 ENCOUNTER — CONSULT (OUTPATIENT)
Dept: NEUROLOGY | Facility: CLINIC | Age: 34
End: 2024-12-26
Payer: COMMERCIAL

## 2024-12-26 ENCOUNTER — TELEPHONE (OUTPATIENT)
Age: 34
End: 2024-12-26

## 2024-12-26 ENCOUNTER — APPOINTMENT (OUTPATIENT)
Dept: LAB | Facility: CLINIC | Age: 34
End: 2024-12-26
Payer: COMMERCIAL

## 2024-12-26 VITALS
TEMPERATURE: 97.9 F | HEART RATE: 71 BPM | DIASTOLIC BLOOD PRESSURE: 65 MMHG | WEIGHT: 137 LBS | BODY MASS INDEX: 25.21 KG/M2 | HEIGHT: 62 IN | SYSTOLIC BLOOD PRESSURE: 99 MMHG | OXYGEN SATURATION: 99 %

## 2024-12-26 DIAGNOSIS — R25.9 ABNORMAL MOVEMENTS: Primary | ICD-10-CM

## 2024-12-26 DIAGNOSIS — R25.9 ABNORMAL MOVEMENTS: ICD-10-CM

## 2024-12-26 LAB
25(OH)D3 SERPL-MCNC: 23.5 NG/ML (ref 30–100)
ALBUMIN SERPL BCG-MCNC: 4.4 G/DL (ref 3.5–5)
ALP SERPL-CCNC: 51 U/L (ref 34–104)
ALT SERPL W P-5'-P-CCNC: 132 U/L (ref 7–52)
ANION GAP SERPL CALCULATED.3IONS-SCNC: 6 MMOL/L (ref 4–13)
AST SERPL W P-5'-P-CCNC: 59 U/L (ref 13–39)
BILIRUB SERPL-MCNC: 0.37 MG/DL (ref 0.2–1)
BUN SERPL-MCNC: 13 MG/DL (ref 5–25)
CALCIUM SERPL-MCNC: 9 MG/DL (ref 8.4–10.2)
CHLORIDE SERPL-SCNC: 103 MMOL/L (ref 96–108)
CO2 SERPL-SCNC: 30 MMOL/L (ref 21–32)
CREAT SERPL-MCNC: 0.64 MG/DL (ref 0.6–1.3)
FERRITIN SERPL-MCNC: 101 NG/ML (ref 11–307)
GFR SERPL CREATININE-BSD FRML MDRD: 116 ML/MIN/1.73SQ M
GLUCOSE P FAST SERPL-MCNC: 94 MG/DL (ref 65–99)
IRON SATN MFR SERPL: 22 % (ref 15–50)
IRON SERPL-MCNC: 82 UG/DL (ref 50–212)
POTASSIUM SERPL-SCNC: 4.1 MMOL/L (ref 3.5–5.3)
PROT SERPL-MCNC: 6.8 G/DL (ref 6.4–8.4)
SODIUM SERPL-SCNC: 139 MMOL/L (ref 135–147)
TIBC SERPL-MCNC: 369.6 UG/DL (ref 250–450)
TRANSFERRIN SERPL-MCNC: 264 MG/DL (ref 203–362)
TSH SERPL DL<=0.05 MIU/L-ACNC: 1.87 UIU/ML (ref 0.45–4.5)
UIBC SERPL-MCNC: 288 UG/DL (ref 155–355)
VIT B12 SERPL-MCNC: 922 PG/ML (ref 180–914)

## 2024-12-26 PROCEDURE — 99203 OFFICE O/P NEW LOW 30 MIN: CPT | Performed by: NURSE PRACTITIONER

## 2024-12-26 PROCEDURE — 83540 ASSAY OF IRON: CPT

## 2024-12-26 PROCEDURE — 82607 VITAMIN B-12: CPT

## 2024-12-26 PROCEDURE — 84207 ASSAY OF VITAMIN B-6: CPT

## 2024-12-26 PROCEDURE — 36415 COLL VENOUS BLD VENIPUNCTURE: CPT

## 2024-12-26 PROCEDURE — 84443 ASSAY THYROID STIM HORMONE: CPT

## 2024-12-26 PROCEDURE — 83550 IRON BINDING TEST: CPT

## 2024-12-26 PROCEDURE — 82728 ASSAY OF FERRITIN: CPT

## 2024-12-26 PROCEDURE — 82306 VITAMIN D 25 HYDROXY: CPT

## 2024-12-26 PROCEDURE — 80053 COMPREHEN METABOLIC PANEL: CPT

## 2024-12-26 RX ORDER — TOPIRAMATE 25 MG/1
TABLET, FILM COATED ORAL
Qty: 60 TABLET | Refills: 5 | Status: SHIPPED | OUTPATIENT
Start: 2024-12-26

## 2024-12-26 NOTE — PROGRESS NOTES
"Review of Systems   Constitutional:  Negative for appetite change, fatigue and fever.   HENT: Negative.  Negative for hearing loss, tinnitus, trouble swallowing and voice change.    Eyes: Negative.  Negative for photophobia, pain and visual disturbance.   Respiratory: Negative.  Negative for shortness of breath.    Cardiovascular: Negative.  Negative for palpitations.   Gastrointestinal: Negative.  Negative for nausea and vomiting.   Endocrine: Negative.  Negative for cold intolerance.   Genitourinary: Negative.  Negative for dysuria, frequency and urgency.   Musculoskeletal:  Negative for back pain, gait problem, myalgias, neck pain and neck stiffness.   Skin: Negative.  Negative for rash.   Allergic/Immunologic: Negative.    Neurological: Negative.  Negative for dizziness, tremors, seizures, syncope, facial asymmetry, speech difficulty, weakness, light-headedness, numbness and headaches.        Started 6/2023- neck feels \"itchy\"- moves head/ shoulders- constant    Hematological: Negative.  Does not bruise/bleed easily.   Psychiatric/Behavioral: Negative.  Negative for confusion, hallucinations and sleep disturbance.    All other systems reviewed and are negative.      "

## 2024-12-26 NOTE — TELEPHONE ENCOUNTER
Please have her repeat the CMP to confirm that it is not a fluke. Can you let me know her preferred lab?

## 2024-12-26 NOTE — PATIENT INSTRUCTIONS
Start topirmate 25 mg 1 tab at bedtime, if no improvement in 1 week increase to 1 tab twice daily.  If no improvement over several weeks contact office for further direction. Call sooner if side effects    Obtain labs.

## 2024-12-26 NOTE — TELEPHONE ENCOUNTER
Patient called and stated she went to the neurologist today and neurologist wants patient to start topomax 25mg once a day but wanted patient to check with mental health provider first, patient also stated that her liver enzymes from her labs were elevated along with her b12 level, patient stated the only thing that has changed was when she started taking the effexor, patient would like a call back from provider to discuss

## 2024-12-27 ENCOUNTER — RESULTS FOLLOW-UP (OUTPATIENT)
Dept: NEUROLOGY | Facility: CLINIC | Age: 34
End: 2024-12-27

## 2024-12-27 ENCOUNTER — TELEPHONE (OUTPATIENT)
Dept: PSYCHIATRY | Facility: CLINIC | Age: 34
End: 2024-12-27

## 2024-12-27 DIAGNOSIS — Z79.899 MEDICATION COURSE CHANGED: Primary | ICD-10-CM

## 2024-12-27 NOTE — TELEPHONE ENCOUNTER
Called Debbie and informed her Dr. Jay will like her to repeat the CMP. She was understanding and in agreement. She gets them done at a [facility]'s lab.

## 2024-12-30 ENCOUNTER — TELEPHONE (OUTPATIENT)
Dept: PSYCHIATRY | Facility: CLINIC | Age: 34
End: 2024-12-30

## 2024-12-30 ENCOUNTER — APPOINTMENT (OUTPATIENT)
Dept: LAB | Facility: HOSPITAL | Age: 34
End: 2024-12-30
Payer: COMMERCIAL

## 2024-12-30 DIAGNOSIS — Z79.899 MEDICATION COURSE CHANGED: ICD-10-CM

## 2024-12-30 LAB
ALBUMIN SERPL BCG-MCNC: 4.3 G/DL (ref 3.5–5)
ALP SERPL-CCNC: 53 U/L (ref 34–104)
ALT SERPL W P-5'-P-CCNC: 97 U/L (ref 7–52)
ANION GAP SERPL CALCULATED.3IONS-SCNC: 7 MMOL/L (ref 4–13)
AST SERPL W P-5'-P-CCNC: 44 U/L (ref 13–39)
BILIRUB SERPL-MCNC: 0.23 MG/DL (ref 0.2–1)
BUN SERPL-MCNC: 14 MG/DL (ref 5–25)
CALCIUM SERPL-MCNC: 9.2 MG/DL (ref 8.4–10.2)
CHLORIDE SERPL-SCNC: 102 MMOL/L (ref 96–108)
CO2 SERPL-SCNC: 27 MMOL/L (ref 21–32)
CREAT SERPL-MCNC: 0.66 MG/DL (ref 0.6–1.3)
GFR SERPL CREATININE-BSD FRML MDRD: 115 ML/MIN/1.73SQ M
GLUCOSE P FAST SERPL-MCNC: 94 MG/DL (ref 65–99)
POTASSIUM SERPL-SCNC: 4 MMOL/L (ref 3.5–5.3)
PROT SERPL-MCNC: 6.5 G/DL (ref 6.4–8.4)
SODIUM SERPL-SCNC: 136 MMOL/L (ref 135–147)

## 2024-12-30 PROCEDURE — 80053 COMPREHEN METABOLIC PANEL: CPT

## 2024-12-30 PROCEDURE — 36415 COLL VENOUS BLD VENIPUNCTURE: CPT

## 2024-12-30 NOTE — TELEPHONE ENCOUNTER
Message left for Debbie with message from doctor regarding lab results. Call back number provided for questions or concerns.

## 2024-12-31 LAB — VIT B6 SERPL-MCNC: 21.5 UG/L (ref 3.4–65.2)

## 2025-04-09 ENCOUNTER — TELEMEDICINE (OUTPATIENT)
Dept: PSYCHIATRY | Facility: CLINIC | Age: 35
End: 2025-04-09
Payer: COMMERCIAL

## 2025-04-09 DIAGNOSIS — G47.00 INSOMNIA, UNSPECIFIED TYPE: ICD-10-CM

## 2025-04-09 DIAGNOSIS — F41.1 GAD (GENERALIZED ANXIETY DISORDER): ICD-10-CM

## 2025-04-09 PROCEDURE — 90833 PSYTX W PT W E/M 30 MIN: CPT | Performed by: STUDENT IN AN ORGANIZED HEALTH CARE EDUCATION/TRAINING PROGRAM

## 2025-04-09 PROCEDURE — 99213 OFFICE O/P EST LOW 20 MIN: CPT | Performed by: STUDENT IN AN ORGANIZED HEALTH CARE EDUCATION/TRAINING PROGRAM

## 2025-04-11 ENCOUNTER — TELEPHONE (OUTPATIENT)
Dept: PSYCHIATRY | Facility: CLINIC | Age: 35
End: 2025-04-11

## 2025-04-11 NOTE — TELEPHONE ENCOUNTER
Called pt and left VM regarding scheduling 3mo f/u appt with Dr. Jay.    Gave [facility] phone number to call back and schedule with provider.

## 2025-04-17 RX ORDER — TRAZODONE HYDROCHLORIDE 50 MG/1
50 TABLET ORAL DAILY
Qty: 30 TABLET | Refills: 2 | Status: SHIPPED | OUTPATIENT
Start: 2025-04-17 | End: 2025-07-16

## 2025-04-17 RX ORDER — VENLAFAXINE HYDROCHLORIDE 75 MG/1
75 TABLET, EXTENDED RELEASE ORAL
Qty: 90 TABLET | Refills: 0 | Status: SHIPPED | OUTPATIENT
Start: 2025-04-17 | End: 2025-07-16

## 2025-04-17 NOTE — PSYCH
MEDICATION MANAGEMENT NOTE        [facility]      Name and Date of Birth:  Debbie Yeung 34 y.o. 1990 MRN: [id number]    Date of Visit: April 17, 2025    Reason for Visit: Follow-up visit for medication management     Administrative Statements   Encounter provider Brissa Jay DO    The Patient is located at Home and in the following state in which I hold an active license [place].    The patient was identified by name and date of birth. Debbie Yeung was informed that this is a telemedicine visit and that the visit is being conducted through the Epic Embedded platform. She agrees to proceed..  My office door was closed. No one else was in the room.  She acknowledged consent and understanding of privacy and security of the video platform. The patient has agreed to participate and understands they can discontinue the visit at any time.    I have spent a total time of 28 minutes in caring for this patient on the day of the visit/encounter including Diagnostic results, Prognosis, Risks and benefits of tx options, Instructions for management, Patient and family education, Importance of tx compliance, Risk factor reductions, Impressions, Counseling / Coordination of care, Documenting in the medical record, Reviewing/placing orders in the medical record (including tests, medications, and/or procedures), and Obtaining or reviewing history  , not including the time spent for establishing the audio/video connection.       SUBJECTIVE:    Debbie Yeung is a 34 y.o. female with past psychiatric history significant for  who was personally seen and evaluated today at the [facility] outpatient clinic for follow-up and medication management. Debbie denies SI, HI, AVH, delusions, nikunj since her last appointment.  After discussion of risks, benefits, potential side effects, terms, patient would like to remain on current regimen as is without any changes due  to its effectiveness.  She denies acute mental complaints concerns at this time    Current Rating Scores:     None completed today.    Review Of Systems:      Constitutional negative   ENT negative   Cardiovascular negative   Respiratory negative   Gastrointestinal negative   Genitourinary negative   Musculoskeletal negative   Integumentary negative   Neurological Occasional tics   Endocrine negative   Other Symptoms none, all other systems are negative       Past Psychiatric History: (unchanged information from previous note copied and italicized) - Information that is bolded has been updated.     See intake    Substance Abuse History: (unchanged information from previous note copied and italicized) - Information that is bolded has been updated.     See intake    Social History: (unchanged information from previous note copied and italicized) - Information that is bolded has been updated.     See intake    Traumatic History: (unchanged information from previous note copied and italicized) - Information that is bolded has been updated.     See intake      Past Medical History:    Past Medical History:   Diagnosis Date    Acne     Anxiety     Depression         No past surgical history on file.  No Known Allergies    Substance Abuse History:    Social History     Substance and Sexual Activity   Alcohol Use Never     Social History     Substance and Sexual Activity   Drug Use Not Currently       Social History:    Social History     Socioeconomic History    Marital status: Single     Spouse name: Not on file    Number of children: Not on file    Years of education: Not on file    Highest education level: Not on file   Occupational History    Not on file   Tobacco Use    Smoking status: Former     Current packs/day: 0.00     Average packs/day: 0.3 packs/day for 10.0 years (2.5 ttl pk-yrs)     Types: Cigarettes     Start date: 1/15/2010     Quit date: 1/15/2020     Years since quittin.2    Smokeless tobacco: Never  "  Vaping Use    Vaping status: Never Used   Substance and Sexual Activity    Alcohol use: Never    Drug use: Not Currently    Sexual activity: Yes     Partners: Male     Birth control/protection: I.U.D.   Other Topics Concern    Not on file   Social History Narrative    Not on file     Social Drivers of Health     Financial Resource Strain: Not on file   Food Insecurity: Not on file   Transportation Needs: Not on file   Physical Activity: Not on file   Stress: Not on file   Social Connections: Not on file   Intimate Partner Violence: Not on file   Housing Stability: Not on file       Family Psychiatric History:     Family History   Problem Relation Age of Onset    Heart attack Mother     Hypertension Mother     Acne Father     No Known Problems Sister     No Known Problems Brother     Hypertension Maternal Grandmother     Diabetes Maternal Grandmother     Hyperlipidemia Maternal Grandfather     Hypertension Maternal Grandfather     Diabetes Maternal Grandfather     Heart attack Paternal Grandmother     Heart disease Paternal Grandmother     Breast cancer Paternal Grandmother     Stroke Paternal Grandmother     Asthma Paternal Grandfather        History Review: The following portions of the patient's history were reviewed and updated as appropriate: allergies, current medications, past family history, past medical history, past social history, past surgical history, and problem list.         OBJECTIVE:     Vital signs in last 24 hours:    There were no vitals filed for this visit.    Mental Status Evaluation:    Appearance age appropriate, casually dressed   Behavior Cooperative, calm   Speech normal rate, normal volume, normal pitch   Mood \"Good\"   Affect constricted   Thought Processes organized, goal directed   Associations intact associations   Thought Content no overt delusions   Perceptual Disturbances: no auditory hallucinations, no visual hallucinations   Abnormal Thoughts  Risk Potential Suicidal ideation - " None  Homicidal ideation - None  Potential for aggression - No   Orientation oriented to person, place, time/date, and situation   Memory recent and remote memory grossly intact   Consciousness alert and awake   Attention Span Concentration Span attention span and concentration are age appropriate   Intellect appears to be of average intelligence   Insight intact   Judgement intact   Muscle Strength and  Gait unable to assess today due to virtual visit   Motor activity unable to assess today due to virtual visit   Language no difficulty naming common objects, no difficulty repeating a phrase   Fund of Knowledge adequate knowledge of current events  adequate fund of knowledge regarding past history   Pain none   Pain Scale Did not ask patient to formally rate       Laboratory Results: I have personally reviewed all pertinent laboratory/tests results    Recent Labs (last 2 months):   No visits with results within 2 Month(s) from this visit.   Latest known visit with results is:   Appointment on 12/30/2024   Component Date Value    Sodium 12/30/2024 136     Potassium 12/30/2024 4.0     Chloride 12/30/2024 102     CO2 12/30/2024 27     ANION GAP 12/30/2024 7     BUN 12/30/2024 14     Creatinine 12/30/2024 0.66     Glucose, Fasting 12/30/2024 94     Calcium 12/30/2024 9.2     AST 12/30/2024 44 (H)     ALT 12/30/2024 97 (H)     Alkaline Phosphatase 12/30/2024 53     Total Protein 12/30/2024 6.5     Albumin 12/30/2024 4.3     Total Bilirubin 12/30/2024 0.23     eGFR 12/30/2024 115        Suicide/Homicide Risk Assessment:    Risk of Harm to Self:  The following ratings are based on assessment at the time of the interview  Historical Risk Factors include: chronic psychiatric problems  Protective Factors: no current suicidal ideation, access to mental health treatment, compliant with medications, compliant with mental health treatment, having a desire to be alive, responsibilities and duties to others, stable living  environment, stable job, strong relationships    Risk of Harm to Others:  The following ratings are based on assessment at the time of the interview  Historical Risk Factors include: none.  Protective Factors: no current homicidal ideation    The following interventions are recommended: contracts for safety at present - agrees to go to ED if feeling unsafe, contracts for safety at present - agrees to call Crisis Intervention Service if feeling unsafe      Lethality Statement:    Based on today's assessment and clinical criteria, Debbie Yeung contracts for safety and is not an imminent risk of harm to self or others. Outpatient level of care is deemed appropriate at this current time. Debbie understands that if they can no longer contract for safety, they need to call the office or report to their nearest Emergency Room for immediate evaluation. They voiced understanding and agreement to call 911 or head to the nearest ED should they have any physical or mental decompensation whatsoever.       Assessment/Plan:     1.)  PAMELA  2.)  Provisional Tic disorder  3.)          After discussion of risks, benefits, potential side effects, alternatives, we will continue venlafaxine 75 mg daily, trazodone 50 mg as needed for insomnia          Medications Risks/Benefits      Risks, Benefits And Possible Side Effects Of Medications:    Risks, benefits, and possible side effects of medications explained to Debbie and she verbalizes understanding and agreement for treatment.  Risks of medications in pregnancy explained to Debbie. She verbalizes understanding and agrees to notify her doctor if she becomes pregnant.    Controlled Medication Discussion:     Not applicable    Psychotherapy Provided:     Individual psychotherapy provided: Crisis/safety plan discussed with Debbie.     Treatment Plan:    Completed and signed during the session:  We will complete at next appointment due to lack of time today      Visit Time    Visit  Start Time: 1:30 PM  Visit Stop Time: 1:58 PM  Total Visit Duration:  28 minutes     The total visit duration detailed above includes: patient engagement, medication management, psychotherapy/counseling, discussion regarding treatment goals, documentation, review of past medical records, and coordination of care.      Note Share Disclaimer:     This note was not shared with the patient due to reasonable likelihood of causing patient harm      Brissa Jay DO  Psychiatry  04/17/25

## 2025-04-21 ENCOUNTER — TELEPHONE (OUTPATIENT)
Dept: PSYCHIATRY | Facility: CLINIC | Age: 35
End: 2025-04-21

## 2025-04-24 ENCOUNTER — APPOINTMENT (OUTPATIENT)
Dept: LAB | Facility: CLINIC | Age: 35
End: 2025-04-24
Payer: COMMERCIAL

## 2025-04-24 DIAGNOSIS — Z02.1 PRE-EMPLOYMENT HEALTH SCREENING EXAMINATION: ICD-10-CM

## 2025-04-24 PROCEDURE — 36415 COLL VENOUS BLD VENIPUNCTURE: CPT

## 2025-04-24 PROCEDURE — 86706 HEP B SURFACE ANTIBODY: CPT

## 2025-04-25 LAB — HBV SURFACE AB SER-ACNC: >500 MIU/ML

## 2025-05-16 ENCOUNTER — TELEPHONE (OUTPATIENT)
Age: 35
End: 2025-05-16

## 2025-05-16 NOTE — TELEPHONE ENCOUNTER
Patient contacted the office to schedule a follow up visit with provider. Patient is now scheduled for 7/16  at 10 virtually.

## 2025-07-16 ENCOUNTER — TELEPHONE (OUTPATIENT)
Dept: PSYCHIATRY | Facility: CLINIC | Age: 35
End: 2025-07-16

## 2025-07-16 ENCOUNTER — TELEMEDICINE (OUTPATIENT)
Dept: PSYCHIATRY | Facility: CLINIC | Age: 35
End: 2025-07-16
Payer: COMMERCIAL

## 2025-07-16 DIAGNOSIS — F41.1 GAD (GENERALIZED ANXIETY DISORDER): ICD-10-CM

## 2025-07-16 DIAGNOSIS — Z79.899 MEDICATION COURSE CHANGED: Primary | ICD-10-CM

## 2025-07-16 DIAGNOSIS — G47.00 INSOMNIA, UNSPECIFIED TYPE: ICD-10-CM

## 2025-07-16 PROCEDURE — 99214 OFFICE O/P EST MOD 30 MIN: CPT | Performed by: STUDENT IN AN ORGANIZED HEALTH CARE EDUCATION/TRAINING PROGRAM

## 2025-07-16 PROCEDURE — 90833 PSYTX W PT W E/M 30 MIN: CPT | Performed by: STUDENT IN AN ORGANIZED HEALTH CARE EDUCATION/TRAINING PROGRAM

## 2025-07-16 RX ORDER — VENLAFAXINE HYDROCHLORIDE 75 MG/1
75 TABLET, EXTENDED RELEASE ORAL
Qty: 90 TABLET | Refills: 0 | Status: SHIPPED | OUTPATIENT
Start: 2025-07-16 | End: 2025-10-14

## 2025-07-16 RX ORDER — BUSPIRONE HYDROCHLORIDE 7.5 MG/1
7.5 TABLET ORAL 2 TIMES DAILY
Qty: 180 TABLET | Refills: 0 | Status: SHIPPED | OUTPATIENT
Start: 2025-07-16 | End: 2025-10-14

## 2025-07-16 RX ORDER — TRAZODONE HYDROCHLORIDE 50 MG/1
50 TABLET ORAL DAILY
Qty: 90 TABLET | Refills: 0 | Status: SHIPPED | OUTPATIENT
Start: 2025-07-16 | End: 2025-10-14

## 2025-07-16 NOTE — TELEPHONE ENCOUNTER
Called pt regarding needing required documents signed before we are able to check them in for their med mgmt appt on 7/16. Pt expressed understanding.

## 2025-07-18 ENCOUNTER — TELEPHONE (OUTPATIENT)
Dept: PSYCHIATRY | Facility: CLINIC | Age: 35
End: 2025-07-18

## 2025-07-23 NOTE — PSYCH
MEDICATION MANAGEMENT NOTE        [facility]      Name and Date of Birth:  Debbie Yeung 34 y.o. 1990 MRN: [id number]    Date of Visit: July 22, 2025    Reason for Visit: Follow-up visit for medication management     Administrative Statements   Encounter provider Brissa Jay DO    The Patient is located at Home and in the following state in which I hold an active license [place].    The patient was identified by name and date of birth. Debbie Yeung was informed that this is a telemedicine visit and that the visit is being conducted through the Epic Embedded platform. She agrees to proceed..  My office door was closed. No one else was in the room.  She acknowledged consent and understanding of privacy and security of the video platform. The patient has agreed to participate and understands they can discontinue the visit at any time.    I have spent a total time of 28 minutes in caring for this patient on the day of the visit/encounter including Diagnostic results, Prognosis, Risks and benefits of tx options, Instructions for management, Patient and family education, Importance of tx compliance, Risk factor reductions, Impressions, Counseling / Coordination of care, Documenting in the medical record, Reviewing/placing orders in the medical record (including tests, medications, and/or procedures), and Obtaining or reviewing history  , not including the time spent for establishing the audio/video connection.       SUBJECTIVE:    Debbie Yeung is a 34 y.o. female with past psychiatric history significant for  who was personally seen and evaluated today at the [facility] outpatient clinic for follow-up and medication management. Debbie denies SI, HI, AVH, delusions, nikunj since her last appointment.  After discussion of risks, benefits, potential side effects, terms, patient will reinitiate buspirone 7.5 mg twice daily to better control anxiety  as she believes that though she self-discontinued herself from buspirone she may want to reinitiate it now.  She denies acute mental health complaints or concerns at this time    Current Rating Scores:     None completed today.    Review Of Systems:      Constitutional negative   ENT negative   Cardiovascular negative   Respiratory negative   Gastrointestinal negative   Genitourinary negative   Musculoskeletal negative   Integumentary negative   Neurological Occasional tics   Endocrine negative   Other Symptoms none, all other systems are negative       Past Psychiatric History: (unchanged information from previous note copied and italicized) - Information that is bolded has been updated.     See intake    Substance Abuse History: (unchanged information from previous note copied and italicized) - Information that is bolded has been updated.     See intake    Social History: (unchanged information from previous note copied and italicized) - Information that is bolded has been updated.     See intake    Traumatic History: (unchanged information from previous note copied and italicized) - Information that is bolded has been updated.     See intake      Past Medical History:    Past Medical History:   Diagnosis Date    Acne     Anxiety     Depression         No past surgical history on file.  No Known Allergies    Substance Abuse History:    Social History     Substance and Sexual Activity   Alcohol Use Never     Social History     Substance and Sexual Activity   Drug Use Not Currently       Social History:    Social History     Socioeconomic History    Marital status: Single     Spouse name: Not on file    Number of children: Not on file    Years of education: Not on file    Highest education level: Not on file   Occupational History    Not on file   Tobacco Use    Smoking status: Former     Current packs/day: 0.00     Average packs/day: 0.3 packs/day for 10.0 years (2.5 ttl pk-yrs)     Types: Cigarettes     Start  "date: 1/15/2010     Quit date: 1/15/2020     Years since quittin.5    Smokeless tobacco: Never   Vaping Use    Vaping status: Never Used   Substance and Sexual Activity    Alcohol use: Never    Drug use: Not Currently    Sexual activity: Yes     Partners: Male     Birth control/protection: I.U.D.   Other Topics Concern    Not on file   Social History Narrative    Not on file     Social Drivers of Health     Financial Resource Strain: Not on file   Food Insecurity: Not on file   Transportation Needs: Not on file   Physical Activity: Not on file   Stress: Not on file   Social Connections: Not on file   Intimate Partner Violence: Not on file   Housing Stability: Not on file       Family Psychiatric History:     Family History   Problem Relation Name Age of Onset    Heart attack Mother Horacio Yeung     Hypertension Mother Horacio Yeung     Acne Father Osman Yeung     No Known Problems Sister      No Known Problems Brother      Hypertension Maternal Grandmother      Diabetes Maternal Grandmother      Hyperlipidemia Maternal Grandfather Edson Roro     Hypertension Maternal Grandfather Edson Roro     Diabetes Maternal Grandfather Edson Roro     Heart attack Paternal Grandmother Charo     Heart disease Paternal Grandmother Charo     Breast cancer Paternal Grandmother Charo     Stroke Paternal Grandmother Charo     Asthma Paternal Grandfather         History Review: The following portions of the patient's history were reviewed and updated as appropriate: allergies, current medications, past family history, past medical history, past social history, past surgical history, and problem list.         OBJECTIVE:     Vital signs in last 24 hours:    There were no vitals filed for this visit.    Mental Status Evaluation:    Appearance age appropriate, casually dressed   Behavior Cooperative, mildly anxious   Speech normal rate, normal volume, normal pitch   Mood \"Okay\"   Affect constricted   Thought Processes " organized, goal directed   Associations intact associations   Thought Content no overt delusions   Perceptual Disturbances: no auditory hallucinations, no visual hallucinations   Abnormal Thoughts  Risk Potential Suicidal ideation - None  Homicidal ideation - None  Potential for aggression - No   Orientation oriented to person, place, time/date, and situation   Memory recent and remote memory grossly intact   Consciousness alert and awake   Attention Span Concentration Span attention span and concentration are age appropriate   Intellect appears to be of average intelligence   Insight intact   Judgement intact   Muscle Strength and  Gait unable to assess today due to virtual visit   Motor activity unable to assess today due to virtual visit   Language no difficulty naming common objects, no difficulty repeating a phrase   Fund of Knowledge adequate knowledge of current events  adequate fund of knowledge regarding past history   Pain none   Pain Scale Did not ask patient to formally rate       Laboratory Results: I have personally reviewed all pertinent laboratory/tests results    Recent Labs (last 2 months):   No visits with results within 2 Month(s) from this visit.   Latest known visit with results is:   Appointment on 04/24/2025   Component Date Value    Hep B S Ab 04/24/2025 >500.00        Suicide/Homicide Risk Assessment:    Risk of Harm to Self:  The following ratings are based on assessment at the time of the interview  Historical Risk Factors include: chronic psychiatric problems  Protective Factors: no current suicidal ideation, access to mental health treatment, compliant with medications, compliant with mental health treatment, having a desire to be alive, responsibilities and duties to others, stable living environment, stable job, strong relationships    Risk of Harm to Others:  The following ratings are based on assessment at the time of the interview  Historical Risk Factors include: none.  Protective  Factors: no current homicidal ideation    The following interventions are recommended: contracts for safety at present - agrees to go to ED if feeling unsafe, contracts for safety at present - agrees to call Crisis Intervention Service if feeling unsafe      Lethality Statement:    Based on today's assessment and clinical criteria, Debbie Yeung contracts for safety and is not an imminent risk of harm to self or others. Outpatient level of care is deemed appropriate at this current time. Debbie understands that if they can no longer contract for safety, they need to call the office or report to their nearest Emergency Room for immediate evaluation. They voiced understanding and agreement to call 911 or head to the nearest ED should they have any physical or mental decompensation whatsoever.       Assessment/Plan:     1.)  PAMELA  2.)  Provisional Tic disorder  3.)          After discussion of risks, benefits, potential side effects, alternatives, we will continue venlafaxine 75 mg daily, trazodone 50 mg as needed for insomnia, initiate buspirone 7.5 mg twice daily.  She denies acute mental health complaints or concerns at this time.  Patient will obtain labs prior to her next appointment          Medications Risks/Benefits      Risks, Benefits And Possible Side Effects Of Medications:    Risks, benefits, and possible side effects of medications explained to Debbie and she verbalizes understanding and agreement for treatment.  Risks of medications in pregnancy explained to Debbie. She verbalizes understanding and agrees to notify her doctor if she becomes pregnant.    Controlled Medication Discussion:     Not applicable    Psychotherapy Provided:     Individual psychotherapy provided: Crisis/safety plan discussed with Debbie. Provided at least 16 minutes of distinct psychotherapy using a combination of supportive, interpersonal, motivational, and problem solving approaches to address psychological distress and  enhance coping strategies.     Treatment Plan:    Completed and signed during the session: We will complete at next appointment due to lack of time today      Visit Time    Visit Start Time: 10:00 AM  Visit Stop Time: 10:28 AM  Total Visit Duration: 28 minutes     The total visit duration detailed above includes: patient engagement, medication management, psychotherapy/counseling, discussion regarding treatment goals, documentation, review of past medical records, and coordination of care.      Note Share Disclaimer:     This note was not shared with the patient due to reasonable likelihood of causing patient harm      Brissa Jay DO  Psychiatry  07/22/25

## 2025-08-07 ENCOUNTER — TELEPHONE (OUTPATIENT)
Dept: PSYCHIATRY | Facility: CLINIC | Age: 35
End: 2025-08-07

## 2025-08-13 ENCOUNTER — APPOINTMENT (OUTPATIENT)
Dept: LAB | Facility: HOSPITAL | Age: 35
End: 2025-08-13
Payer: COMMERCIAL

## 2025-08-13 DIAGNOSIS — Z00.8 HEALTH EXAMINATION IN POPULATION SURVEY: ICD-10-CM

## 2025-08-13 DIAGNOSIS — Z79.899 MEDICATION COURSE CHANGED: ICD-10-CM

## 2025-08-13 LAB
ALBUMIN SERPL BCG-MCNC: 4.4 G/DL (ref 3.5–5)
ALP SERPL-CCNC: 49 U/L (ref 34–104)
ALT SERPL W P-5'-P-CCNC: 54 U/L (ref 7–52)
ANION GAP SERPL CALCULATED.3IONS-SCNC: 8 MMOL/L (ref 4–13)
AST SERPL W P-5'-P-CCNC: 28 U/L (ref 13–39)
BILIRUB SERPL-MCNC: 0.24 MG/DL (ref 0.2–1)
BUN SERPL-MCNC: 21 MG/DL (ref 5–25)
CALCIUM SERPL-MCNC: 9.2 MG/DL (ref 8.4–10.2)
CHLORIDE SERPL-SCNC: 103 MMOL/L (ref 96–108)
CHOLEST SERPL-MCNC: 223 MG/DL (ref ?–200)
CO2 SERPL-SCNC: 27 MMOL/L (ref 21–32)
CREAT SERPL-MCNC: 0.76 MG/DL (ref 0.6–1.3)
GFR SERPL CREATININE-BSD FRML MDRD: 101 ML/MIN/1.73SQ M
GLUCOSE SERPL-MCNC: 87 MG/DL (ref 65–140)
HDLC SERPL-MCNC: 69 MG/DL
LDLC SERPL CALC-MCNC: 136 MG/DL (ref 0–100)
NONHDLC SERPL-MCNC: 154 MG/DL
POTASSIUM SERPL-SCNC: 3.9 MMOL/L (ref 3.5–5.3)
PROT SERPL-MCNC: 7 G/DL (ref 6.4–8.4)
SODIUM SERPL-SCNC: 138 MMOL/L (ref 135–147)
TRIGL SERPL-MCNC: 90 MG/DL (ref ?–150)

## 2025-08-13 PROCEDURE — 80061 LIPID PANEL: CPT

## 2025-08-13 PROCEDURE — 80053 COMPREHEN METABOLIC PANEL: CPT

## 2025-08-13 PROCEDURE — 36415 COLL VENOUS BLD VENIPUNCTURE: CPT
